# Patient Record
Sex: FEMALE | Race: WHITE
[De-identification: names, ages, dates, MRNs, and addresses within clinical notes are randomized per-mention and may not be internally consistent; named-entity substitution may affect disease eponyms.]

---

## 2017-03-19 ENCOUNTER — HOSPITAL ENCOUNTER (INPATIENT)
Dept: HOSPITAL 80 - FED | Age: 82
LOS: 4 days | Discharge: NURSING, REHAB OR LONG-TERM CARE | DRG: 313 | End: 2017-03-23
Attending: INTERNAL MEDICINE | Admitting: INTERNAL MEDICINE
Payer: COMMERCIAL

## 2017-03-19 DIAGNOSIS — I49.5: ICD-10-CM

## 2017-03-19 DIAGNOSIS — Z99.81: ICD-10-CM

## 2017-03-19 DIAGNOSIS — Z95.0: ICD-10-CM

## 2017-03-19 DIAGNOSIS — N18.9: ICD-10-CM

## 2017-03-19 DIAGNOSIS — J96.10: ICD-10-CM

## 2017-03-19 DIAGNOSIS — I95.9: ICD-10-CM

## 2017-03-19 DIAGNOSIS — Z95.5: ICD-10-CM

## 2017-03-19 DIAGNOSIS — F03.90: ICD-10-CM

## 2017-03-19 DIAGNOSIS — I25.2: ICD-10-CM

## 2017-03-19 DIAGNOSIS — I48.0: ICD-10-CM

## 2017-03-19 DIAGNOSIS — R07.9: Primary | ICD-10-CM

## 2017-03-19 DIAGNOSIS — I25.10: ICD-10-CM

## 2017-03-19 LAB
% IMMATURE GRANULYOCYTES: 0.2 % (ref 0–1.1)
ABSOLUTE IMMATURE GRANULOCYTES: 0.01 10^3/UL (ref 0–0.1)
ABSOLUTE NRBC COUNT: 0 10^3/UL (ref 0–0.01)
ADD DIFF?: NO
ADD MORPH?: NO
ADD SCAN?: NO
ANION GAP SERPL CALC-SCNC: 11 MEQ/L (ref 8–16)
ATYPICAL LYMPHOCYTE FLAG: 0 (ref 0–99)
CALCIUM SERPL-MCNC: 8.3 MG/DL (ref 8.5–10.4)
CHLORIDE SERPL-SCNC: 101 MEQ/L (ref 97–110)
CO2 SERPL-SCNC: 25 MEQ/L (ref 22–31)
CREAT SERPL-MCNC: 1.3 MG/DL (ref 0.6–1)
DIGOXIN SERPL-MCNC: 2.4 NG/ML (ref 0.8–2)
ERYTHROCYTE [DISTWIDTH] IN BLOOD BY AUTOMATED COUNT: 14.2 % (ref 11.5–15.2)
FRAGMENT RBC FLAG: 0 (ref 0–99)
GFR SERPL CREATININE-BSD FRML MDRD: 39 ML/MIN/{1.73_M2}
GLUCOSE SERPL-MCNC: 95 MG/DL (ref 70–100)
HCT VFR BLD CALC: 37.7 % (ref 38–47)
HGB BLD-MCNC: 12.3 G/DL (ref 12.6–16.3)
LEFT SHIFT FLG: 0 (ref 0–99)
LIPEMIA HEMOLYSIS FLAG: 80 (ref 0–99)
MCH RBC BLDCO QN: 29.5 PG (ref 27.9–34.1)
MCHC RBC AUTO-ENTMCNC: 32.6 G/DL (ref 32.4–36.7)
MCV RBC AUTO: 90.4 FL (ref 81.5–99.8)
NRBC-AUTO%: 0 % (ref 0–0.2)
PLATELET # BLD: 139 10^3/UL (ref 150–400)
PLATELET CLUMPS FLAG: 0 (ref 0–99)
PMV BLD AUTO: 9.8 FL (ref 8.7–11.7)
POTASSIUM SERPL-SCNC: 5.1 MEQ/L (ref 3.5–5.2)
RBC # BLD AUTO: 4.17 10^6/UL (ref 4.18–5.33)
SODIUM SERPL-SCNC: 137 MEQ/L (ref 134–144)
TROPONIN I SERPL-MCNC: 0.12 NG/ML (ref 0–0.03)

## 2017-03-19 PROCEDURE — A9500 TC99M SESTAMIBI: HCPCS

## 2017-03-19 PROCEDURE — G0378 HOSPITAL OBSERVATION PER HR: HCPCS

## 2017-03-19 RX ADMIN — DOFETILIDE SCH MG: 0.25 CAPSULE ORAL at 20:45

## 2017-03-19 RX ADMIN — ATORVASTATIN CALCIUM SCH MG: 40 TABLET, FILM COATED ORAL at 20:45

## 2017-03-19 RX ADMIN — GABAPENTIN SCH MG: 300 CAPSULE ORAL at 20:45

## 2017-03-19 RX ADMIN — POTASSIUM CHLORIDE SCH MEQ: 1.5 POWDER, FOR SOLUTION ORAL at 20:45

## 2017-03-19 NOTE — ECHO
[id number]

[id number]



+---------------------------------------------------+      [address]

:                                                   :       [place] [address]

:                                                   :           [phone]

+---------------------------------------------------+       Fax [phone]



                       Adult Echocardiographic Report



+---------------------------------------------------------------------------+

:Name: SEDA RENE LStudy Date: 2017 11:31 AM                        :

:MRN: [id number]     Hospital Admission Number: [id number]                :

:: 1934     Gender: Female                         Height: 65 in   :

:Age: 82 yrs         Race: WH                               Weight: 145 lb  :

:Reason For Study: Eval LV Fx                                               :

:                                                           BSA: 1.7 meters2:

:History: CP, SOB, Pacemaker, Stents                                        :

+---------------------------------------------------------------------------+

MMode/2D Measurements \T\ Calculations

IVSd: 1.0 cm   LVIDd: 4.5 cm  FS: 25.5 %             Ao root diam: 2.9 cm

LVPWd: 1.2 cm  LVIDs: 3.3 cm  EDV(Teich): 91.0 ml    ACS: 1.4 cm

                              ESV(Teich): 45.1 ml

                              EF(Teich): 50.5 %



Normal Measurement Values:

+----------------------------------------------------------------------------

----------------------------------+

:LVIDd (3.5-5.7cm)    IVSd (0.6-1.1cm)     LVPWd (0.6-1.1cm)     Aortic Root 

(2.0-3.7cm)Left Atrium (1.5-4.0cm):

:LV Vol(d) (76-115ml) LV Vol(s) (29-48ml)  Ejec Fraction (50-65%)PV Otilio (0.6-

1.2m/s)    TV Otilio (0.4-1.0m/s)    :

:MV E Otilio (0.8-1.0m/s)MV A Otilio (0.3-1.0m/s)LVOT Otilio (0.7-1.2m/s) Asc Ao Otilio (

0.9-1.8m/s)                       :

+----------------------------------------------------------------------------

----------------------------------+

Doppler Measurements \T\ Calculations

MV E max otilio:       Ao V2 max:         LV V1 max:        TR max otilio:

103.7 cm/sec        169.7 cm/sec       112.5 cm/sec      259.3 cm/sec

MV A max otilio:       Ao max PG:         LV V1 max PG:     TR max P.0 cm/sec         11.5 mmHg          5.1 mmHg          26.9 mmHg

MV E/A: 2.5                                              RAP systole:

                                                         5.0 mmHg

                                                         RVSP(TR): 31.9 mmHg



Left Ventricle

The left ventricle is normal in size. There is normal left ventricular wall

thickness. Left ventricular systolic function is low normal. There is basiar

to mid anteroseptal hypokinesis, etiology is uncertain, CAD or pace

dysschrony.





Right Ventricle

There is a pacemaker lead in the right ventricle. The right ventricle is

normal in size and function.





Atria

The left atrium is mildly dilated. Right atrial size is normal.



Mitral Valve

The mitral valve leaflets appear thickened, but open well. There is mild

mitral annular calcification. There is no evidence of mitral valve prolapse.

There is no mitral valve stenosis. There is mild mitral regurgitation.



Tricuspid Valve

The tricuspid valve is normal in structure and function. There is mild

tricuspid regurgitation. Right ventricular systolic pressure is 32mmHg.

Doppler findings do not suggest pulmonary hypertension.



Aortic Valve

The aortic valve is trileaflet. The aortic valve opens well. There is no

aortic stenosis. There is no aortic insufficiency.



Pulmonic Valve

The pulmonic valve is normal in structure and function. There is no pulmonic

valvular regurgitation.





Great Vessels

The aortic root is normal size.



Pericardium/Pleural

There is no pericardial effusion.



Conclusion

A complete two-dimensional transthoracic echocardiogram was performed (2D,

M-mode, Doppler and color flow Doppler).

The patient is tachycardic

Left ventricular systolic function is low normal.

There is basiar to mid anteroseptal hypokinesis, etiology is uncertain, CAD

or pace dysschrony.

There is a pacemaker lead in the right ventricle.

The left atrium is mildly dilated.

The mitral valve leaflets appear thickened, but open well.

There is mild mitral annular calcification.

There is mild mitral regurgitation.

There is mild tricuspid regurgitation.

Right ventricular systolic pressure is 32mmHg.

Doppler findings do not suggest pulmonary hypertension.

The aortic valve is trileaflet.

There is no pericardial effusion.



Compared with 2016, wall motion description is similar. Small

pericardial effusion no longer present.





_____________________________________________________________________________





Final Reading Physician:

                        Dr Dolores Mitchell  electronically signed on 2017

                        12:19 PM

Ordering Physician: CÉSAR SANCHEZ

Performed By: Pavel Parish, CECILIACS

## 2017-03-19 NOTE — CPEKG
Heart Rate: 79

RR Interval: 759

P-R Interval: 168

QRSD Interval: 126

QT Interval: 408

QTC Interval: 468

P Axis: -63

QRS Axis: -61

T Wave Axis: 101

EKG Severity - ABNORMAL ECG -

EKG Impression: ATRIAL-PACED COMPLEXES

EKG Impression: LEFT BUNDLE BRANCH BLOCK

Electronically Signed By: Ping Maldonado 19-Mar-2017 09:54:18

## 2017-03-19 NOTE — EDPHY
H & P


Time Seen by Provider: 03/19/17 09:18


HPI/ROS: 





CHIEF COMPLAINT: Chest pain





HISTORY OF PRESENT ILLNESS: This patient is an 82 year old woman, with a 

history of coronary artery disease h/o LAD and RCA stent, chronic renal disease

, and chronic supplemental oxygen use (2L), arriving by EMS from assisted 

living facility with three days of chest pain. The pain has been intermittent 

and moderate in severity; aggravated by deep inspiration and with exertion. It 

is associated with three days of increased dyspnea on exertion and fatigue. She 

has no pain at the time of this history. Symptoms are unchanged by movement or 

twisting of her torso. Denies recent illness or fever.  History of past anginal 

equivalent presenting as dyspnea on exertion.  





LIMITATIONS: History is somewhat limited secondary to dementia. Her daughter is 

at bedside and assisted with this history. 





REVIEW OF SYSTEMS:


Constitutional: No fever, no chills


Eyes: No visual changes


ENT: No sore throat


Respiratory: dyspnea on exertion, no cough


Cardiac:  chest pain which is pleuritic in nature and is aggravated by exertion


Gastrointestinal: No nausea, no vomiting, no abdominal pain


Genitourinary: No hematuria, no dysuria


Musculoskeletal: No leg pain or swelling


Skin: No rash


Neurological: Recent mental cognitive decline. No headache, no numbness, no 

weakness


Psychiatric: No depression





Past Medical/Surgical History: 





history of coronary artery disease h/o LAD and RCA stent (last 08/2016), 

paroxysmal atrial fibrillation, s/p pacemaker placement, chronic renal disease, 

chronic supplemental oxygen use (2L), chronic back pain x 45 years





Social History: 





Cardiologist is Dr. Moran. Lives in assisted living facility. Her daughter is 

at bedside.





Smoking Status: Never smoked


Physical Exam: 





General Appearance: Alert, no distress


Eyes: Pupils equal and round, no conjunctival pallor or injection


ENT, Mouth: Mucous membranes moist


Neck: Normal inspection


Respiratory: Lungs are clear to auscultation


Cardiovascular: Regular rate and rhythm 


Gastrointestinal:  Abdomen is soft and non- tender 


Neurological:  A&O, nonfocal, normal gait


Skin:  Warm and dry, no rash, two fentanyl patches on back


Extremities:  Nontender, no pedal edema


Psychiatric: Flat affect





Constitutional: 


 Initial Vital Signs











Temperature (C)  37.3 C   03/19/17 09:16


 


Heart Rate  80   03/19/17 09:16


 


Respiratory Rate  18   03/19/17 09:16


 


Blood Pressure  97/52 L  03/19/17 09:16


 


O2 Sat (%)  92   03/19/17 09:16








 











O2 Delivery Mode               Nasal Cannula


 


O2 (L/minute)                  3














Allergies/Adverse Reactions: 


 





nickel [Nickel] Allergy (Severe, Verified 07/22/14 11:24)


 Contact Dermatitis


Penicillins Allergy (Severe, Verified 07/22/14 11:24)


 Rash


Sulfa (Sulfonamide Antibiotics) [Sulfa(Sulfonamide Antibiotics)] Allergy (Severe

, Verified 07/22/14 11:24)


 Nausea


SEASONAL Allergy (Severe, Uncoded 07/22/14 11:24)


 Rhinitis








Home Medications: 














 Medication  Instructions  Recorded


 


Cetirizine [ZyrTEC 10 mg (*)] 10 mg PO DAILY 01/24/13


 


Clopidogrel Bisulfate [Plavix (*)] 75 mg PO DAILY #0 tab 06/24/14


 


Calcium Carb/Vit D3/Minerals 1 each PO DAILY 02/22/15





[Calcium 600+D Plus Minerals Tb]  


 


Hydrocodone/Acetaminophen [Norco 1 each PO Q6 PRN 02/22/15





5/325 (*)]  


 


Digoxin [Lanoxin 0.125 mg] 0.125 mg PO DAILY10 #0 tab 03/05/15


 


Acetaminophen [Tylenol 325mg (*)] 650 mg PO Q6 PRN 08/22/16


 


Atorvastatin Calcium [Lipitor 40 40 mg PO HS 08/22/16





mg (*)]  


 


Fludrocortisone Acetate [Florinef] 0.2 mg PO DAILY 08/22/16


 


Gabapentin [Neurontin 300 MG (*)] 300 mg PO BID 08/22/16


 


Hydrocodone/Acetaminophen [Molino 1 each PO BID 08/22/16





5/325 (*)]  


 


LORazepam [Ativan (*)] 0.25 mg PO DAILY PRN 08/22/16


 


LORazepam [Ativan (*)] 0.25 mg PO HS 08/22/16


 


Meclizine HCl 12.5 mg PO TID PRN 08/22/16


 


Methylcellulose [Citrucel] 1,000 mg PO DAILY 08/22/16


 


Pantoprazole Sodium [Protonix 40mg 40 mg PO DAILY 08/22/16





(*)]  


 


fentaNYL [Duragesic 12 MCG Patch 12 mcg TD Q72H 08/22/16





(*)]  


 


fentaNYL [Duragesic 25 MCG Patch 25 mcg TD Q72H 08/22/16





(*)]  


 


Aspirin [Aspirin 81mg (*)] 81 mg PO DAILY 03/19/17


 


Calcium Carbonate [Tums 500MG (*)] 1,000 mg PO Q8 PRN 03/19/17


 


Dofetilide [Tikosyn 0.25 MG (*)] 0.25 mg PO BID 03/19/17


 


Loperamide HCl [Imodium 2 mg (*)] 2 mg PO Q6 PRN 03/19/17


 


Magnesium Hydroxide [Milk of 30 ml PO DAILY PRN 03/19/17





Magnesia]  


 


Methyl Salicylate/Menth/Camph 1 each TP DAILY PRN 03/19/17





[Salonpas Large Patch]  


 


Mometasone Furoate 2 spray NS DAILY PRN 03/19/17


 


Ondansetron Odt [Zofran Odt 4 mg 4 mg PO Q8 PRN 03/19/17





(*)]  


 


Potassium Chloride [Klor-Con] 20 meq PO BID 03/19/17


 


Sertraline HCl [Zoloft 100mg (*)] 100 mg PO DAILY 03/19/17


 


celeCOXIB [Celebrex (*)] 200 mg PO DAILY 03/19/17


 


guaiFENesin [Mucinex 600 MG (*)] 600 mg PO BID PRN 03/19/17














Medical Decision Making





- Diagnostics


EKG Interpretation: 





The 12 lead EKG was interpreted by myself. Atrial paced complexes, rate 79, 

LBBB. Compared to previous EKGs, no acute ischemic changes. See hard copy and/

or "tracemaster" electronic copy for interpretation.





Imaging: 





Chest x-ray was obtained.  I viewed the images myself on the PACS system.  My 

interpretation of the images is: No acute disease. Hiatal hernia present.  The 

radiologist interpretation is pending at this time.  I discussed the x-ray 

findings with the patient.





ED Course/Re-evaluation: 





This patient presents with three days of intermittent chest pain, pleuritic and 

worse with exertion. Associated with dyspnea on exertion. History is 

significant for CAD with LAD and RCA stents (last in 08/2016). Anginal 

equivalent has presented with dyspnea on exertion previously. An IV has been 

established. She received 500mL IV normal saline for hypotension, BP 77/45. 





EKG shows chronic LBBB, but no acute ischemic changes compared to previous 

EKGs. Chest XR shows no acute findings.  Troponin is elevated at 0.125. Pt 

remains asymptomatic.  ASA already taken. Patient will be admitted for acute 

coronary syndrome.





1025: I consulted with the hospitalist service. Dr. Barr accepts admission. 

  Blood pressure has improved to 103/55 after fluid bolus.  Continues to be 

asymptomatic.  Dr. Moran has been notified of the patient's admission. 

Echocardiogram has been ordered.





Differential Diagnosis: 





includes though not limited to ACS, PE, pneumonia, pulmonary edema, empyema.








- Data Points


Laboratory Results: 


 Laboratory Results





 03/19/17 09:35 





 03/19/17 09:35 








Medications Given: 


 








Discontinued Medications





Aspirin (Aspirin)  324 mg PO EDNOW ONE


   Stop: 03/19/17 10:30


   Last Admin: 03/19/17 10:38 Dose:  324 mg


Sodium Chloride (Ns)  500 mls @ 0 mls/hr IV ONCE ONE


   PRN Reason: As Directed


   Stop: 03/19/17 09:39


   Last Admin: 03/19/17 09:41 Dose:  500 mls








Departure





- Departure


Disposition: [facility] [place] [facility]


Clinical Impression: 


 Acute coronary syndrome, Elevated troponin





Condition: Fair


Report Scribed for: Ping Maldonado


Report Scribed by: Criselda Carver


Date of Report: 03/19/17


Time of Report: 09:24


Physician Review and Approval Statement: 





03/19/17 09:24


Portions of this note were transcribed by a medical scribe.  I personally 

performed a history, physical exam, medical decision making, and confirmed 

accuracy of information the transcribed note.

## 2017-03-20 LAB
ANION GAP SERPL CALC-SCNC: 6 MEQ/L (ref 8–16)
CALCIUM SERPL-MCNC: 7.9 MG/DL (ref 8.5–10.4)
CHLORIDE SERPL-SCNC: 102 MEQ/L (ref 97–110)
CO2 SERPL-SCNC: 26 MEQ/L (ref 22–31)
CREAT SERPL-MCNC: 1.1 MG/DL (ref 0.6–1)
GFR SERPL CREATININE-BSD FRML MDRD: 48 ML/MIN/{1.73_M2}
GLUCOSE SERPL-MCNC: 88 MG/DL (ref 70–100)
POTASSIUM SERPL-SCNC: 4.8 MEQ/L (ref 3.5–5.2)
SODIUM SERPL-SCNC: 134 MEQ/L (ref 134–144)

## 2017-03-20 RX ADMIN — CLOPIDOGREL BISULFATE SCH MG: 75 TABLET, FILM COATED ORAL at 08:05

## 2017-03-20 RX ADMIN — POTASSIUM CHLORIDE SCH MEQ: 1.5 POWDER, FOR SOLUTION ORAL at 20:24

## 2017-03-20 RX ADMIN — SERTRALINE HYDROCHLORIDE SCH MG: 100 TABLET ORAL at 08:07

## 2017-03-20 RX ADMIN — POTASSIUM CHLORIDE SCH MEQ: 1.5 POWDER, FOR SOLUTION ORAL at 08:05

## 2017-03-20 RX ADMIN — ATORVASTATIN CALCIUM SCH MG: 40 TABLET, FILM COATED ORAL at 20:24

## 2017-03-20 RX ADMIN — CETIRIZINE HYDROCHLORIDE SCH MG: 10 TABLET, FILM COATED ORAL at 08:07

## 2017-03-20 RX ADMIN — DOFETILIDE SCH MG: 0.25 CAPSULE ORAL at 20:24

## 2017-03-20 RX ADMIN — GABAPENTIN SCH MG: 300 CAPSULE ORAL at 20:24

## 2017-03-20 RX ADMIN — DOFETILIDE SCH MG: 0.25 CAPSULE ORAL at 08:06

## 2017-03-20 RX ADMIN — GABAPENTIN SCH MG: 300 CAPSULE ORAL at 08:06

## 2017-03-20 RX ADMIN — Medication SCH MG: at 10:01

## 2017-03-20 RX ADMIN — PANTOPRAZOLE SODIUM SCH MG: 40 TABLET, DELAYED RELEASE ORAL at 08:06

## 2017-03-20 RX ADMIN — ASPIRIN 81 MG SCH MG: 81 TABLET ORAL at 08:07

## 2017-03-20 RX ADMIN — FLUDROCORTISONE ACETATE SCH MG: 0.1 TABLET ORAL at 08:06

## 2017-03-20 NOTE — HOSPPROG
Hospitalist Progress Note


Assessment/Plan: 





82-year-old female admitted with a complaint of chest pain.  Serial troponin 

show very mild elevation without a rising trend.  Cardiac echo shows a normal 

to low normal LV function without signs of wall motion abnormality.  Patient 

has been on  medical management since cardiac catheterization and stent 

placement in October 2016 by Dr. Bryn Moran. 





-chest pain:  Mild elevation of troponin without a rising trend.





-known coronary artery disease under medical management





-chronic respiratory failure on supplemental oxygen





-history of AFib in patient has refused anticoagulation.  She received dual anti

-platelet therapy for her stent placement and known coronary artery disease.





-status post ppm remotely





-dementia:  She seems to be have her baseline here.  She lives in assisted 

living at [facility]





-chronic pain syndrome:  This seems under good control with the use of 

Neurontin and a fentanyl patch along with supplemental Norco.  Patient does 

have a history of requesting frequent pain medications and we are limiting her 

access to narcotics.





-code status:  DNR





Plan:  Cardiology consultation with Dr. Bryn Moran.  Possible discharge today 

following this consultation


Subjective: no complaints


Objective: 


 Vital Signs











Temp Pulse Resp BP Pulse Ox


 


 36.6 C   78   19   127/68 H  97 


 


 03/20/17 08:00  03/20/17 08:00  03/20/17 08:00  03/20/17 08:00  03/20/17 08:00








 Laboratory Results





 03/20/17 03:35 





 











 03/19/17 03/20/17 03/21/17





 05:59 05:59 05:59


 


Intake Total  1100 


 


Output Total  1150 450


 


Balance  -50 -450














- Physical Exam


Constitutional: no apparent distress


Eyes: PERRL, anicteric sclera


Ears, Nose, Mouth, Throat: moist mucous membranes


Cardiovascular: regular rate and rhythym, systolic murmur


Respiratory: no respiratory distress, no rales or rhonchi, clear to auscultation


Gastrointestinal: normoactive bowel sounds, soft, non-tender abdomen


Skin: warm


Neurologic: CN II-XII Intact





ICD10 Worksheet


Patient Problems: 


 Problems











Problem Status Onset


 


Humerus shaft fracture Acute  


 


CAD (coronary artery disease) Acute  


 


Obstructive sleep apnea Acute  


 


Elevated troponin Acute  


 


Weakness Acute  


 


Acute coronary syndrome Acute

## 2017-03-21 RX ADMIN — DOFETILIDE SCH MG: 0.25 CAPSULE ORAL at 20:12

## 2017-03-21 RX ADMIN — POTASSIUM CHLORIDE SCH MEQ: 1.5 POWDER, FOR SOLUTION ORAL at 20:12

## 2017-03-21 RX ADMIN — GABAPENTIN SCH MG: 300 CAPSULE ORAL at 20:11

## 2017-03-21 RX ADMIN — Medication SCH MG: at 08:29

## 2017-03-21 RX ADMIN — CLOPIDOGREL BISULFATE SCH MG: 75 TABLET, FILM COATED ORAL at 08:29

## 2017-03-21 RX ADMIN — PANTOPRAZOLE SODIUM SCH MG: 40 TABLET, DELAYED RELEASE ORAL at 08:30

## 2017-03-21 RX ADMIN — ENOXAPARIN SODIUM SCH MG: 100 INJECTION SUBCUTANEOUS at 14:08

## 2017-03-21 RX ADMIN — ASPIRIN 81 MG SCH MG: 81 TABLET ORAL at 08:29

## 2017-03-21 RX ADMIN — FLUDROCORTISONE ACETATE SCH MG: 0.1 TABLET ORAL at 08:30

## 2017-03-21 RX ADMIN — CETIRIZINE HYDROCHLORIDE SCH MG: 10 TABLET, FILM COATED ORAL at 08:29

## 2017-03-21 RX ADMIN — POTASSIUM CHLORIDE SCH MEQ: 1.5 POWDER, FOR SOLUTION ORAL at 08:30

## 2017-03-21 RX ADMIN — GABAPENTIN SCH MG: 300 CAPSULE ORAL at 08:29

## 2017-03-21 RX ADMIN — ATORVASTATIN CALCIUM SCH MG: 40 TABLET, FILM COATED ORAL at 20:10

## 2017-03-21 RX ADMIN — SERTRALINE HYDROCHLORIDE SCH MG: 100 TABLET ORAL at 08:29

## 2017-03-21 RX ADMIN — DOFETILIDE SCH MG: 0.25 CAPSULE ORAL at 08:30

## 2017-03-21 NOTE — HOSPPROG
Hospitalist Progress Note


Assessment/Plan: 





# chest pain - will get nuc stress here with indmichael trop; has been seen by Dr Dean gaviria


# CAD s/p PCI to RCA and LAD, last stent 10/2016


   - cont asa/plavix/statin


# paroxysmal a-fib/ppm: digoxin, dofetilide, asa for CVA ppx


   - currently paced


# CKD at [facility]


# chronic pain on continuous narcotics - fentanyl TD


# hypotension: florinef


# possible dementia - unclear baseline, but living in [facility] at [facility]


# chronic resp failure on 2L O2


# DNR


# lovenox





##


new pt to me


chart reviewed


CXR personally reveiwed


ECG personally reviewed





Subjective: no chest pain; does not remember having had CP


Objective: 


 Vital Signs











Temp Pulse Resp BP Pulse Ox


 


 36.5 C   107 H  14   114/63   95 


 


 03/21/17 12:00  03/21/17 12:00  03/21/17 12:00  03/21/17 12:00  03/21/17 12:00








 Laboratory Results





 03/20/17 03:35 





 











 03/20/17 03/21/17 03/22/17





 05:59 05:59 05:59


 


Intake Total 1100 550 


 


Output Total 1150 1650 


 


Balance -50 -1100 














- Physical Exam


Constitutional: no apparent distress, appears nourished


Cardiovascular: regular rate and rhythym, no murmur, rub, or gallop


Respiratory: no respiratory distress, no rales or rhonchi, clear to auscultation


Gastrointestinal: normoactive bowel sounds, soft, non-tender abdomen, no 

palpable masses





ICD10 Worksheet


Patient Problems: 


 Problems











Problem Status Onset


 


Humerus shaft fracture Acute  


 


CAD (coronary artery disease) Acute  


 


Obstructive sleep apnea Acute  


 


Elevated troponin Acute  


 


Weakness Acute  


 


Acute coronary syndrome Acute

## 2017-03-22 LAB
% IMMATURE GRANULYOCYTES: 0.2 % (ref 0–1.1)
ABSOLUTE IMMATURE GRANULOCYTES: 0.01 10^3/UL (ref 0–0.1)
ABSOLUTE NRBC COUNT: 0 10^3/UL (ref 0–0.01)
ADD DIFF?: NO
ADD MORPH?: NO
ADD SCAN?: NO
ANION GAP SERPL CALC-SCNC: 10 MEQ/L (ref 8–16)
ATYPICAL LYMPHOCYTE FLAG: 80 (ref 0–99)
CALCIUM SERPL-MCNC: 8.6 MG/DL (ref 8.5–10.4)
CHLORIDE SERPL-SCNC: 103 MEQ/L (ref 97–110)
CO2 SERPL-SCNC: 24 MEQ/L (ref 22–31)
CREAT SERPL-MCNC: 0.9 MG/DL (ref 0.6–1)
ERYTHROCYTE [DISTWIDTH] IN BLOOD BY AUTOMATED COUNT: 13.9 % (ref 11.5–15.2)
FRAGMENT RBC FLAG: 0 (ref 0–99)
GFR SERPL CREATININE-BSD FRML MDRD: 60 ML/MIN/{1.73_M2}
GLUCOSE SERPL-MCNC: 81 MG/DL (ref 70–100)
HCT VFR BLD CALC: 35.8 % (ref 38–47)
HGB BLD-MCNC: 11.6 G/DL (ref 12.6–16.3)
LEFT SHIFT FLG: 0 (ref 0–99)
LIPEMIA HEMOLYSIS FLAG: 80 (ref 0–99)
MCH RBC BLDCO QN: 28.6 PG (ref 27.9–34.1)
MCHC RBC AUTO-ENTMCNC: 32.4 G/DL (ref 32.4–36.7)
MCV RBC AUTO: 88.4 FL (ref 81.5–99.8)
NRBC-AUTO%: 0 % (ref 0–0.2)
PLATELET # BLD: 155 10^3/UL (ref 150–400)
PLATELET CLUMPS FLAG: 0 (ref 0–99)
PMV BLD AUTO: 9.4 FL (ref 8.7–11.7)
POTASSIUM SERPL-SCNC: 4.2 MEQ/L (ref 3.5–5.2)
RBC # BLD AUTO: 4.05 10^6/UL (ref 4.18–5.33)
SODIUM SERPL-SCNC: 137 MEQ/L (ref 134–144)

## 2017-03-22 RX ADMIN — GABAPENTIN SCH MG: 300 CAPSULE ORAL at 20:03

## 2017-03-22 RX ADMIN — POTASSIUM CHLORIDE SCH MEQ: 1.5 POWDER, FOR SOLUTION ORAL at 20:03

## 2017-03-22 RX ADMIN — ASPIRIN 81 MG SCH MG: 81 TABLET ORAL at 10:12

## 2017-03-22 RX ADMIN — FLUDROCORTISONE ACETATE SCH MG: 0.1 TABLET ORAL at 10:12

## 2017-03-22 RX ADMIN — POTASSIUM CHLORIDE SCH MEQ: 1.5 POWDER, FOR SOLUTION ORAL at 10:13

## 2017-03-22 RX ADMIN — LOPERAMIDE HYDROCHLORIDE PRN MG: 2 CAPSULE ORAL at 20:03

## 2017-03-22 RX ADMIN — Medication SCH: at 10:26

## 2017-03-22 RX ADMIN — DOFETILIDE SCH MG: 0.25 CAPSULE ORAL at 10:24

## 2017-03-22 RX ADMIN — DOFETILIDE SCH MG: 0.25 CAPSULE ORAL at 20:03

## 2017-03-22 RX ADMIN — CETIRIZINE HYDROCHLORIDE SCH MG: 10 TABLET, FILM COATED ORAL at 10:12

## 2017-03-22 RX ADMIN — Medication SCH MG: at 10:24

## 2017-03-22 RX ADMIN — CLOPIDOGREL BISULFATE SCH MG: 75 TABLET, FILM COATED ORAL at 10:14

## 2017-03-22 RX ADMIN — SERTRALINE HYDROCHLORIDE SCH MG: 100 TABLET ORAL at 10:13

## 2017-03-22 RX ADMIN — GABAPENTIN SCH MG: 300 CAPSULE ORAL at 10:12

## 2017-03-22 RX ADMIN — ENOXAPARIN SODIUM SCH MG: 100 INJECTION SUBCUTANEOUS at 10:14

## 2017-03-22 RX ADMIN — PANTOPRAZOLE SODIUM SCH MG: 40 TABLET, DELAYED RELEASE ORAL at 10:12

## 2017-03-22 RX ADMIN — ATORVASTATIN CALCIUM SCH MG: 40 TABLET, FILM COATED ORAL at 20:03

## 2017-03-22 NOTE — PDCARST
CAR Stress Test Results


Type of Stress Test: Lexiscan stress test


Indication: elevated trop/cp


Description of Procedure: After informed consent was obtained, pt was 

established to ECG, HR, oximetry, and BP monitoring. At b/;, pt has A-paced and 

V-paced (or V-sensed with BBB), /88, HR 80s, and normal O2 sat on 3 lpm 

supplemental O2. Lexiscan was infused with typical side effects. Vitals and ECG 

remained stable throughout testing and in recovery.


Impression: Uneventful Lexiscan infusion


Conclusion: Await nuclear imaging

## 2017-03-22 NOTE — HOSPPROG
Hospitalist Progress Note


Assessment/Plan: 








# chest pain/azalia with infarcts, no ischemia


   - will d/w cardiology but suspect medical management appropriate


# CAD s/p PCI to RCA and LAD, last stent 10/2016


   - cont asa/plavix/statin


# paroxysmal a-fib/ppm: digoxin, dofetilide, asa for CVA ppx


   - currently paced


# CKD at [facility]


# chronic pain on continuous narcotics - fentanyl TD


# hypotension: florinef


# possible dementia - unclear baseline, but living in [place] at [facility]


# chronic resp failure on 2L O2


# DNR


# lovenox





##


lexiscan reviewed





Subjective: s/p stress; diarrhea yesterday


Objective: 


 Vital Signs











Temp Pulse Resp BP Pulse Ox


 


 36.5 C   81   18   152/82 H  94 


 


 03/22/17 15:39  03/22/17 15:39  03/22/17 15:39  03/22/17 15:39  03/22/17 15:39








 Laboratory Results





 03/22/17 04:05 





 03/22/17 04:05 





 











 03/21/17 03/22/17 03/23/17





 05:59 05:59 05:59


 


Intake Total  750 


 


Output Total  550 600


 


Balance  200 -600














- Physical Exam


Constitutional: no apparent distress, appears nourished


Cardiovascular: regular rate and rhythym, no murmur, rub, or gallop


Respiratory: no respiratory distress, no rales or rhonchi, clear to auscultation


Gastrointestinal: normoactive bowel sounds, soft, non-tender abdomen, no 

palpable masses





ICD10 Worksheet


Patient Problems: 


 Problems











Problem Status Onset


 


Humerus shaft fracture Acute  


 


CAD (coronary artery disease) Acute  


 


Obstructive sleep apnea Acute  


 


Elevated troponin Acute  


 


Weakness Acute  


 


Acute coronary syndrome Acute

## 2017-03-22 NOTE — CPEKG
Heart Rate: 136

RR Interval: 441

P-R Interval: 160

QRSD Interval: 132

QT Interval: 376

QTC Interval: 566

P Axis: 0

QRS Axis: -63

T Wave Axis: 99

EKG Severity - ABNORMAL ECG -

EKG Impression: ATRIAL FIBRILLATION WITH RAPID V-RATE

EKG Impression: LEFT BUNDLE BRANCH BLOCK

Electronically Signed By: Darryn Rendon 23-Mar-2017 14:20:13

## 2017-03-22 NOTE — HOSPPROG
Hospitalist Progress Note


Assessment/Plan: 





XC note:


Called by RN for rapid HR, up to 170's, now 130's.  No symptoms.  Reviewed tele 

and EKG.  She is on Tikosyn.  Digoxin held likely due to level of 2.4 on 

admission (3 days ago).  Repeat Dig level 0.6.  She is given 0.125 mg Digoxin 

for rate control tonight.


Objective: 


 Vital Signs











Temp Pulse Resp BP Pulse Ox


 


 36.3 C   78   24 H  100/79   97 


 


 03/22/17 19:55  03/22/17 19:55  03/22/17 19:55  03/22/17 19:55  03/22/17 19:55








 Laboratory Results





 03/22/17 04:05 





 03/22/17 04:05 





 











 03/21/17 03/22/17 03/23/17





 05:59 05:59 05:59


 


Intake Total  750 500


 


Output Total  550 800


 


Balance  200 -300














ICD10 Worksheet


Patient Problems: 


 Problems











Problem Status Onset


 


Acute coronary syndrome Acute  


 


Elevated troponin Acute  


 


CAD (coronary artery disease) Acute  


 


Humerus shaft fracture Acute  


 


Obstructive sleep apnea Acute  


 


Weakness Acute

## 2017-03-23 VITALS — SYSTOLIC BLOOD PRESSURE: 149 MMHG | DIASTOLIC BLOOD PRESSURE: 75 MMHG | HEART RATE: 75 BPM

## 2017-03-23 VITALS — TEMPERATURE: 97.8 F

## 2017-03-23 VITALS — RESPIRATION RATE: 18 BRPM | OXYGEN SATURATION: 93 %

## 2017-03-23 RX ADMIN — FLUDROCORTISONE ACETATE SCH MG: 0.1 TABLET ORAL at 10:04

## 2017-03-23 RX ADMIN — POTASSIUM CHLORIDE SCH MEQ: 1.5 POWDER, FOR SOLUTION ORAL at 10:04

## 2017-03-23 RX ADMIN — PANTOPRAZOLE SODIUM SCH MG: 40 TABLET, DELAYED RELEASE ORAL at 10:03

## 2017-03-23 RX ADMIN — LOPERAMIDE HYDROCHLORIDE PRN MG: 2 CAPSULE ORAL at 11:17

## 2017-03-23 RX ADMIN — SERTRALINE HYDROCHLORIDE SCH MG: 100 TABLET ORAL at 10:04

## 2017-03-23 RX ADMIN — ASPIRIN 81 MG SCH MG: 81 TABLET ORAL at 10:04

## 2017-03-23 RX ADMIN — Medication SCH MG: at 10:04

## 2017-03-23 RX ADMIN — GABAPENTIN SCH MG: 300 CAPSULE ORAL at 10:03

## 2017-03-23 RX ADMIN — CETIRIZINE HYDROCHLORIDE SCH MG: 10 TABLET, FILM COATED ORAL at 10:03

## 2017-03-23 RX ADMIN — DOFETILIDE SCH MG: 0.25 CAPSULE ORAL at 10:03

## 2017-03-23 RX ADMIN — CLOPIDOGREL BISULFATE SCH MG: 75 TABLET, FILM COATED ORAL at 10:04

## 2017-03-23 NOTE — PDIAF
- Diagnosis


Diagnosis: Chest Pain


Code Status: Do Not Resuscitate





- Medication Management


Discharge Medications: 


 Medications to Continue on Transfer





Cetirizine [ZyrTEC 10 mg (*)] 10 mg PO DAILY 01/24/13 [Last Taken 03/19/17]


Clopidogrel Bisulfate [Plavix (*)] 75 mg PO DAILY #0 tab 06/24/14 [Last Taken 03 /19/17]


Calcium Carb/Vit D3/Minerals [Calcium 600+D Plus Minerals Tb] 1 each PO DAILY 02

/22/15 [Last Taken 03/19/17]


Hydrocodone/Acetaminophen [Norco 5/325 (*)] 1 each PO Q6 PRN 02/22/15 [Last 

Taken 08/17/16]


Digoxin [Lanoxin 0.125 mg] 0.125 mg PO DAILY10 #0 tab 03/05/15 [Last Taken 03/19 /17]


Acetaminophen [Tylenol 325mg (*)] 650 mg PO Q6 PRN 08/22/16 [Last Taken 08/21/16

]


Atorvastatin Calcium [Lipitor 40 mg (*)] 40 mg PO HS 08/22/16 [Last Taken 03/18/ 17]


Fludrocortisone Acetate [Florinef] 0.2 mg PO DAILY 08/22/16 [Last Taken 03/19/17

]


Gabapentin [Neurontin 300 MG (*)] 300 mg PO BID 08/22/16 [Last Taken 03/19/17]


LORazepam [Ativan (*)] 0.25 mg PO DAILY PRN 08/22/16 [Last Taken 08/21/16]


LORazepam [Ativan (*)] 0.25 mg PO HS 08/22/16 [Last Taken 03/18/17]


Meclizine HCl 12.5 mg PO TID PRN 08/22/16 [Last Taken 08/22/16 08:00]


Methylcellulose [Citrucel] 1,000 mg PO DAILY 08/22/16 [Last Taken 03/19/17]


Pantoprazole Sodium [Protonix 40mg (*)] 40 mg PO DAILY 08/22/16 [Last Taken 03/ 19/17]


fentaNYL [Duragesic 12 MCG Patch (*)] 12 mcg TD Q72H 08/22/16 [Last Taken 03/17/ 17]


Aspirin [Aspirin 81mg (*)] 81 mg PO DAILY 03/19/17 [Last Taken 03/19/17]


Calcium Carbonate [Tums 500MG (*)] 1,000 mg PO Q8 PRN 03/19/17 [Last Taken 

Unknown]


Dofetilide [Tikosyn 0.25 MG (*)] 0.25 mg PO BID 03/19/17 [Last Taken 03/19/17]


Loperamide HCl [Imodium 2 mg (*)] 2 mg PO Q6 PRN 03/19/17 [Last Taken Unknown]


Methyl Salicylate/Menth/Camph [Salonpas Large Patch] 1 each TP DAILY PRN 03/19/ 17 [Last Taken Unknown]


Mometasone Furoate 2 spray NS DAILY PRN 03/19/17 [Last Taken Unknown]


Ondansetron Odt [Zofran Odt 4 mg (*)] 4 mg PO Q8 PRN 03/19/17 [Last Taken 

Unknown]


Potassium Chloride [Klor-Con] 20 meq PO BID 03/19/17 [Last Taken 03/19/17]


Sertraline HCl [Zoloft 100mg (*)] 100 mg PO DAILY 03/19/17 [Last Taken 03/19/17]


celeCOXIB [Celebrex (*)] 200 mg PO DAILY 03/19/17 [Last Taken 03/19/17]


guaiFENesin [Mucinex 600 MG (*)] 600 mg PO BID PRN 03/19/17 [Last Taken Unknown]


Metoprolol Tartrate [Lopressor 25 mg (*)] 12.5 mg PO BID #60 tab 03/23/17 [Last 

Taken Unknown]








Discharge Medications: Refer to the Discharge Home Medication list for PRN 

reason.





- Orders


Services needed: Registered Nurse, Certified Nursing Aide, Physical Therapy, 

Occupational Therapy





- Follow Up Care


Current Providers and Referrals: 


Anita Wang MD [Primary Care Provider] - As per Instructions

## 2017-03-23 NOTE — GDS
[f rep st]



                                                             DISCHARGE SUMMARY





DIAGNOSES:  

1.  Chest pain.

2.  Coronary artery disease.  

3.  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

4.  Coronary artery disease, status post PCI to right coronary artery and left anterior descending. 

5.  Paroxysmal atrial fibrillation, status post pacemaker placement.

6.  Chronic kidney disease.

7.  Chronic pain on continuous narcotics.  

8.  Hypotension and hypertension.

9.  Encephalopathy versus dementia.

10.  Chronic respiratory failure.



HOSPITAL COURSE:  This is an 82-year-old female who presented with chest pain.  Troponins were mildl
y elevated at around 0.1 but flat.  She was seen by Dr. Moran.  Lexiscan was ordered which showed 
multifocal infarcts.  Given that there was no ischemia seen, elected for medical management at this 
point.  Discussed this with Dr. Beni Pagan.  She will be discharged with appropriate medications inclu
ding aspirin, Lipitor, addition of a beta-blocker.  She is also on Plavix. 



Course has been slightly complicated by some confusion.  She likely has some underlying dementia but
 has been slightly worse while she is here.  I think that this is probably hospital-induced delirium
, which is mild, on top of her dementia.  I think she will do better after discharge from the hospit
al. 



She has a history of hypotension on Florinef.  She has actually been quite hypertensive today since 
she has been here.  We will add low-dose metoprolol 12.5 p.o. twice daily, which can be discontinued
 if she has any problems from this. 



She has paroxysmal atrial fibrillation as well as a pacemaker.  She had one episode of tachycardia. 
 Her digoxin was restarted after that.  We will continue her dofetilide as well as add metoprolol as
 above.  She is on aspirin for CVA prophylaxis, which is appropriate.



BILLING:  I spent more than 30 minutes on the day of discharge coordinating care.





Job #:  [id number]

## 2017-03-23 NOTE — SOAPPROG
SOAP Progress Note


Assessment/Plan: 


Assessment:


Cardiology ([facility])


1. Admit with intermittent chest pain in the setting of mildly elevated serial 

troponins. Nuclear imaging shows multiple small fixed defects without new 

ischemic territory. There is septal WMA likely consistent with LBBB/pacing.


2. CAD s/p RCA and LAD stenting in August 2016. She remains on DAPT with 

aspirin and Plavix.


3. Uncharacteristically elevated BP.


4. Paroxysmal atrial fibrillation. OAC contraindicated 2/2 fall risk. Rate 

controlled with digoxin, held for supratherapeutic dig level of 2.4, now back 

to normal.


5. History of orthostatic intolerance, treated w/ Florinef.


6. Sick sinus syndrome with PPM in place.


7. Hyperlipidemia.


8. Mild dementia.


9. History of mild renal insufficiency.








Plan:


1. Start metoprolol tartrate 12.5 mg po bid due to elevated BP and rapid 

ventricular rates.


2. Continue all other cardiac medications.


3. OK to discharge back to [facility] today.


4. Office follow up with pacer check in 1-2 weeks.








Subjective: 


No complaints this am. Denies recurrence of chest pain since admission. Can't 

remember having any chest pain prior to admission either. 





Objective: 





 Vital Signs











Temp Pulse Resp BP Pulse Ox


 


 36.6 C   91   16   178/93 H  94 


 


 03/23/17 07:27  03/23/17 07:27  03/23/17 07:27  03/23/17 07:27  03/23/17 07:27








 Laboratory Results





 03/22/17 04:05 





 03/22/17 04:05 





 











 03/22/17 03/23/17 03/24/17





 05:59 05:59 05:59


 


Intake Total 750 500 


 


Output Total 550 1000 


 


Balance 200 -500 














ICD10 Worksheet


Patient Problems: 


 Problems











Problem Status Onset


 


Acute coronary syndrome Acute  


 


Elevated troponin Acute  


 


CAD (coronary artery disease) Acute  


 


Humerus shaft fracture Acute  


 


Obstructive sleep apnea Acute  


 


Weakness Acute

## 2018-09-27 ENCOUNTER — HOSPITAL ENCOUNTER (INPATIENT)
Dept: HOSPITAL 80 - FED | Age: 83
LOS: 5 days | Discharge: NURSING, REHAB OR LONG-TERM CARE | DRG: 292 | End: 2018-10-02
Attending: HOSPITALIST | Admitting: HOSPITALIST
Payer: COMMERCIAL

## 2018-09-27 DIAGNOSIS — B96.20: ICD-10-CM

## 2018-09-27 DIAGNOSIS — I34.0: ICD-10-CM

## 2018-09-27 DIAGNOSIS — N17.9: ICD-10-CM

## 2018-09-27 DIAGNOSIS — I25.10: ICD-10-CM

## 2018-09-27 DIAGNOSIS — N18.3: ICD-10-CM

## 2018-09-27 DIAGNOSIS — I50.21: Primary | ICD-10-CM

## 2018-09-27 DIAGNOSIS — N30.00: ICD-10-CM

## 2018-09-27 DIAGNOSIS — Z66: ICD-10-CM

## 2018-09-27 DIAGNOSIS — I47.2: ICD-10-CM

## 2018-09-27 DIAGNOSIS — I48.91: ICD-10-CM

## 2018-09-27 DIAGNOSIS — Z79.01: ICD-10-CM

## 2018-09-27 DIAGNOSIS — I95.1: ICD-10-CM

## 2018-09-27 DIAGNOSIS — G47.00: ICD-10-CM

## 2018-09-27 DIAGNOSIS — Z95.0: ICD-10-CM

## 2018-09-27 LAB
INR PPP: 1.33 (ref 0.83–1.16)
PLATELET # BLD: 162 10^3/UL (ref 150–400)
PROTHROMBIN TIME: 16.7 SEC (ref 12–15)

## 2018-09-27 PROCEDURE — G0378 HOSPITAL OBSERVATION PER HR: HCPCS

## 2018-09-27 RX ADMIN — METOPROLOL TARTRATE SCH: 50 TABLET, FILM COATED ORAL at 22:38

## 2018-09-27 RX ADMIN — CETIRIZINE HYDROCHLORIDE SCH MG: 10 TABLET, FILM COATED ORAL at 20:28

## 2018-09-27 RX ADMIN — ACETAMINOPHEN SCH MG: 500 TABLET ORAL at 20:28

## 2018-09-27 RX ADMIN — ATORVASTATIN CALCIUM SCH MG: 40 TABLET, FILM COATED ORAL at 20:29

## 2018-09-27 RX ADMIN — DOCUSATE SODIUM AND SENNOSIDES SCH: 50; 8.6 TABLET ORAL at 17:40

## 2018-09-27 RX ADMIN — SODIUM CHLORIDE SCH MLS: 900 INJECTION, SOLUTION INTRAVENOUS at 17:39

## 2018-09-27 RX ADMIN — Medication SCH MG: at 20:27

## 2018-09-27 NOTE — EDPHY
H & P


Stated Complaint: Fatigue, Sent from fauzia's office


Time Seen by Provider: 09/27/18 13:34


HPI/ROS: 





CHIEF COMPLAINT:  Atrial fibrillation





HISTORY OF PRESENT ILLNESS:  The patient is an 83-year-old female sent from Dr. Lyons's office for admission.  She has a history of atrial fibrillation and 

pacemaker.  She presented to his office complaining of mild shortness of breath 

and 2-1 flutter.  Gave her Lasix and a couple of doses of beta-blockers but her 

blood pressure dropped to 90 systolic.  He did not feel comfortable giving her 

more medicine and attempted to admit her directly but there were no beds so 

sent her to the [facility]. He is requesting admission, lab work and preparation for 

ARMAAN and cardioversion tomorrow.  The patient actually states that she is 

asymptomatic.  He denies chest pain or shortness of breath or palpitations.  

She is ambulatory.  No recent fevers or infections.  No GI symptoms.


Severity:  Severe


Modifying factors:  None





REVIEW OF SYSTEMS:


Constitutional:  denies: chills, fever, recent illness, recent injury


EENTM: denies: blurred vision, double vision, nose congestion


Respiratory: denies: cough, shortness of breath


Cardiac:  See HPI


Gastrointestinal/Abdominal: denies: abdominal pain, diarrhea, nausea, vomiting, 

blood streaked stools


Genitourinary: denies: dysuria, frequency, hematuria, pain


Musculoskeletal: denies: joint pain, muscle pain


Skin: denies: lesions, rash, jaundice, bruising


Neurological: denies: headache, numbness, paresthesia, tingling, dizziness, 

weakness


Hematologic/Lymphatic: denies: blood clots, easy bleeding, easy bruising


Immunologic/allergic: denies: HIV/AIDS, transplant


 10 systems reviewed and negative except as noted





EXAM:


GENERAL:  Well-appearing, well-nourished and in no acute distress.


HEAD:  Atraumatic, normocephalic.


EYES:  Pupils equal round and reactive to light, extraocular movements intact, 

sclera anicteric, conjunctiva are normal.


ENT:  TMs normal, nares patent, oropharynx clear without exudates.  Moist 

mucous membranes.


NECK:  Normal range of motion, supple without lymphadenopathy or JVD.


LUNGS:  Breath sounds clear to auscultation bilaterally and equal.  No wheezes 

rales or rhonchi.


HEART:  Tachycardic without murmurs, rubs or gallops.


ABDOMEN:  Soft, nontender, normoactive bowel sounds.  No guarding, no rebound.  

No masses appreciated. 


BACK:  No CVA tenderness, no spinal tenderness, step-offs or deformities


EXTREMITIES:  Normal range of motion, no pitting or edema.  No clubbing or 

cyanosis.


NEUROLOGICAL:  Cranial nerves II through XII grossly intact.  Normal speech, 

normal gait.  5/5 strength, normal movement in all extremities, normal sensation

, normal reflexes


PSYCH:  Normal mood, normal affect.


SKIN:  Warm, dry, normal turgor, no visible rashes or lesions.








Source: Patient


Exam Limitations: No limitations





- Personal History


Current Tetanus/Diphtheria Vaccine: Unsure


Current Tetanus Diphtheria and Acellular Pertussis (TDAP): Unsure


Tetanus Vaccine Date: IN LAST 10 YRS





- Medical/Surgical History


Hx Asthma: No


Hx Chronic Respiratory Disease: Yes


Hx Diabetes: No


Hx Cardiac Disease: Yes


Hx Renal Disease: Yes


Hx Cirrhosis: No


Hx Alcoholism: No


Hx HIV/AIDS: No


Hx Splenectomy or Spleen Trauma: No


Other PMH: CAD,Sleep apnea, "Balance issues", arthritis, depression, afib, stent

, weakness, Rt should joint replacement, Rt. shoulder rebuuilt, rt.upper arm 

rebuilt, back surgeries, KIDNEY DISEASE R/T HEART ISSUE





- Family History


Significant Family History: No pertinent family hx





- Social History


Smoking Status: Never smoked


Alcohol Use: Sober


Drug Use: None


Constitutional: 


 Initial Vital Signs











Temperature (C)  37.0 C   09/27/18 12:48


 


Heart Rate  130 H  09/27/18 12:48


 


Respiratory Rate  18   09/27/18 12:48


 


Blood Pressure  93/75 L  09/27/18 12:48


 


O2 Sat (%)  95   09/27/18 12:48








 











O2 Delivery Mode               Room Air














Allergies/Adverse Reactions: 


 





nickel [Nickel] Allergy (Severe, Verified 07/22/14 11:24)


 Contact Dermatitis


Penicillins Allergy (Severe, Verified 07/22/14 11:24)


 Rash


Sulfa (Sulfonamide Antibiotics) [Sulfa(Sulfonamide Antibiotics)] Allergy (Severe

, Verified 07/22/14 11:24)


 Nausea


SEASONAL Allergy (Severe, Uncoded 07/22/14 11:24)


 Rhinitis








Home Medications: 














 Medication  Instructions  Recorded


 


Cetirizine [ZyrTEC 10 mg (*)] 10 mg PO DAILY 01/24/13


 


Clopidogrel Bisulfate [Plavix (*)] 75 mg PO DAILY #0 tab 06/24/14


 


Calcium Carb/Vit D3/Minerals 1 each PO DAILY 02/22/15





[Calcium 600+D Plus Minerals Tb]  


 


Hydrocodone/Acetaminophen [Norco 1 each PO Q6 PRN 02/22/15





5/325 (*)]  


 


Digoxin [Lanoxin 0.125 mg] 0.125 mg PO DAILY10 #0 tab 03/05/15


 


Acetaminophen [Tylenol 325mg (*)] 650 mg PO Q6 PRN 08/22/16


 


Atorvastatin Calcium [Lipitor 40 40 mg PO HS 08/22/16





mg (*)]  


 


Fludrocortisone Acetate [Florinef] 0.2 mg PO DAILY 08/22/16


 


Gabapentin [Neurontin 300 MG (*)] 300 mg PO BID 08/22/16


 


LORazepam [Ativan (*)] 0.25 mg PO DAILY PRN 08/22/16


 


LORazepam [Ativan (*)] 0.25 mg PO HS 08/22/16


 


Meclizine HCl 12.5 mg PO TID PRN 08/22/16


 


Methylcellulose [Citrucel] 1,000 mg PO DAILY 08/22/16


 


Pantoprazole Sodium [Protonix 40mg 40 mg PO DAILY 08/22/16





(*)]  


 


fentaNYL [Duragesic 12 MCG Patch 12 mcg TD Q72H 08/22/16





(*)]  


 


Aspirin [Aspirin 81mg (*)] 81 mg PO DAILY 03/19/17


 


Calcium Carbonate [Tums 500MG (*)] 1,000 mg PO Q8 PRN 03/19/17


 


Dofetilide [Tikosyn 0.25 MG (*)] 0.25 mg PO BID 03/19/17


 


Loperamide HCl [Imodium 2 mg (*)] 2 mg PO Q6 PRN 03/19/17


 


Methyl Salicylate/Menth/Camph 1 each TP DAILY PRN 03/19/17





[Salonpas Large Patch]  


 


Mometasone Furoate 2 spray NS DAILY PRN 03/19/17


 


Ondansetron Odt [Zofran Odt 4 mg 4 mg PO Q8 PRN 03/19/17





(*)]  


 


Potassium Chloride [Klor-Con] 20 meq PO BID 03/19/17


 


Sertraline HCl [Zoloft 100mg (*)] 100 mg PO DAILY 03/19/17


 


celeCOXIB [Celebrex (*)] 200 mg PO DAILY 03/19/17


 


guaiFENesin [Mucinex 600 MG (*)] 600 mg PO BID PRN 03/19/17


 


Metoprolol Tartrate [Lopressor 25 12.5 mg PO BID #60 tab 03/23/17





mg (*)]  














Medical Decision Making





- Diagnostics


EKG Interpretation: 





An EKG obtained and was read and documented in trace view.  Please see trace 

view for full reading and report.  Atrial flutter with up apparentcy, similar 

to previous 


Imaging Results: 


 Imaging Impressions





Chest X-Ray  09/27/18 13:37


Impression: Stable chest.











ED Course/Re-evaluation: 





1:50 p.m. I discussed the case with Dr. Lam who will admit to the medical 

service.


Differential Diagnosis: 





Partial list of the Differential diagnosis considered include but were not 

limited to;  atrial fibrillation, arrhythmia, head injury and although unlikely 

based on the history and physical exam, I also considered infection, pneumonia, 

acute coronary disease. 


Critical Care Time: 





Critical care time spent by me, Dr. Titus exclusive with this patient was 35 

minutes, exclusive of the PA time exclusive of procedures.  The organ system 

that was at risk was cardiovascular and I gave consultation, admission, 

medications to prevent worsening of the patient's condition





- Data Points


Laboratory Results: 


 Laboratory Results





 09/27/18 13:22 





 09/27/18 13:22 





 











  09/27/18 09/27/18 09/27/18





  13:22 13:22 13:22


 


WBC      6.91 10^3/uL 10^3/uL





     (3.80-9.50) 


 


RBC      4.19 10^6/uL 10^6/uL





     (4.18-5.33) 


 


Hgb      11.1 g/dL L g/dL





     (12.6-16.3) 


 


Hct      34.8 % L %





     (38.0-47.0) 


 


MCV      83.1 fL fL





     (81.5-99.8) 


 


MCH      26.5 pg L pg





     (27.9-34.1) 


 


MCHC      31.9 g/dL L g/dL





     (32.4-36.7) 


 


RDW      17.0 % H %





     (11.5-15.2) 


 


Plt Count      162 10^3/uL 10^3/uL





     (150-400) 


 


MPV      9.8 fL fL





     (8.7-11.7) 


 


Neut % (Auto)      54.2 % %





     (39.3-74.2) 


 


Lymph % (Auto)      33.3 % %





     (15.0-45.0) 


 


Mono % (Auto)      8.0 % %





     (4.5-13.0) 


 


Eos % (Auto)      3.5 % %





     (0.6-7.6) 


 


Baso % (Auto)      0.9 % %





     (0.3-1.7) 


 


Nucleat RBC Rel Count      0.0 % %





     (0.0-0.2) 


 


Absolute Neuts (auto)      3.75 10^3/uL 10^3/uL





     (1.70-6.50) 


 


Absolute Lymphs (auto)      2.30 10^3/uL 10^3/uL





     (1.00-3.00) 


 


Absolute Monos (auto)      0.55 10^3/uL 10^3/uL





     (0.30-0.80) 


 


Absolute Eos (auto)      0.24 10^3/uL 10^3/uL





     (0.03-0.40) 


 


Absolute Basos (auto)      0.06 10^3/uL 10^3/uL





     (0.02-0.10) 


 


Absolute Nucleated RBC      0.00 10^3/uL 10^3/uL





     (0-0.01) 


 


Immature Gran %      0.1 % %





     (0.0-1.1) 


 


Immature Gran #      0.01 10^3/uL 10^3/uL





     (0.00-0.10) 


 


PT    16.7 SEC H SEC  





    (12.0-15.0)  


 


INR    1.33  H   





    (0.83-1.16)  


 


APTT    31.0 SEC SEC  





    (23.0-38.0)  


 


Sodium  138 mEq/L mEq/L    





   (135-145)   


 


Potassium  4.5 mEq/L mEq/L    





   (3.3-5.0)   


 


Chloride  99 mEq/L mEq/L    





   ()   


 


Carbon Dioxide  26 mEq/l mEq/l    





   (22-31)   


 


Anion Gap  13 mEq/L mEq/L    





   (8-16)   


 


BUN  43 mg/dL H mg/dL    





   (7-23)   


 


Creatinine  1.9 mg/dL H mg/dL    





   (0.6-1.0)   


 


Estimated GFR  25     





    


 


Glucose  95 mg/dL mg/dL    





   ()   


 


Calcium  9.2 mg/dL mg/dL    





   (8.5-10.4)   


 


Magnesium  2.1 mg/dL mg/dL    





   (1.6-2.3)   


 


Troponin I  0.014 ng/mL ng/mL    





   (0.000-0.034)   


 


NT-Pro-B Natriuret Pep  7720 pg/mL H pg/mL    





   (0-450)   


 


TSH  2.520 uIU/mL uIU/mL    





   (0.465-4.680)   











Medications Given: 


 








Discontinued Medications





Enoxaparin Sodium (Lovenox)  80 mg SC EDNOW ONE


   Stop: 09/27/18 13:49


   Last Admin: 09/27/18 14:07 Dose:  80 mg








Departure





- Departure


Disposition: [facility] Inpatient Acute


Clinical Impression: 


Atrial fibrillation


Qualifiers:


 Atrial fibrillation type: persistent Qualified Code(s): I48.1 - Persistent 

atrial fibrillation





Condition: Fair

## 2018-09-27 NOTE — PDGENHP
History and Physical





- Chief Complaint


Acute fatigue





- History of Present Illness


Primary care provider:  Dr. Anita Wang


Primary cardiologist:  Dr. Darryn Lyons





HPI:  83-year-old female presents with acutely worsening fatigue characterized 

as spending approximately 18 hr in bed per day with associated dizziness, 

recurrent falls.  The patient reports onset of her fatigue and increased falls 

approximately 6 months ago with persistent duration, resulting in approximately 

a fall every other month.  Patient reports her most recent fall was 9/21, but 

she denies experiencing significant trauma.  She reports approximately 10 lb 

weight gain over the past 3 months and she has been taking her home medications 

as prescribed.  She reports that she drinks a significant amount of fluid on a 

daily basis, but she also urinates heavily as result of her diuretic.  She 

presented to [facility] for evaluation of these issues, and was found 

to be in rapid atrial fibrillation with a heart rate around 130, and was 

triaged to the emergency department after receiving additional doses of oral 

metoprolol.  In the emergency department, her heart rate was in the 130s in her 

systolic blood pressures in the 90s.





History Information





- Allergies/Home Medication List


Allergies/Adverse Reactions: 








nickel [Nickel] Allergy (Severe, Verified 07/22/14 11:24)


 Contact Dermatitis


Penicillins Allergy (Severe, Verified 07/22/14 11:24)


 Rash


Sulfa (Sulfonamide Antibiotics) [Sulfa(Sulfonamide Antibiotics)] Allergy (Severe

, Verified 07/22/14 11:24)


 Nausea


SEASONAL Allergy (Severe, Uncoded 07/22/14 11:24)


 Rhinitis





Home Medications: 








Mometasone Furoate 2 spray NS DAILY PRN 03/19/17 [Last Taken Unknown]


Acetaminophen [Tylenol 325mg (*)] 650 mg PO Q6 PRN 09/27/18 [Last Taken Unknown]


Acetaminophen [Tylenol  mg (*)] 500 mg PO BID 09/27/18 [Last Taken 09/27/ 18 09:00]


Apixaban [Eliquis] 2.5 mg PO BID 09/27/18 [Last Taken 09/27/18 09:00]


Aspirin EC [Aspirin EC 81 mg (*)] 81 mg PO DAILY 09/27/18 [Last Taken 09/27/18]


Atorvastatin Calcium [Lipitor 40 mg (*)] 40 mg PO HS 09/27/18 [Last Taken 09/26/ 18]


Bumetanide [Bumex (*)] 1 mg PO Q2D@0930 09/27/18 [Last Taken 09/27/18]


Calcium Carb W/Vit D [Calcium Carb W/Vit D 500/200 (*)] 500 mg PO HS 09/27/18 [

Last Taken 09/26/18]


Cetirizine [ZyrTEC 10 mg (*)] 10 mg PO HS 09/27/18 [Last Taken 09/26/18]


Cyanocobalamin [Vitamin B12 1000MCG/ML (*)] 1,000 mcg IM FR 09/27/18 [Last 

Taken 09/21/18]


Ferrous Sulfate [Ferrous Sulf 325 MG (*)] 325 mg PO DAILY 09/27/18 [Last Taken 

09/27/18]


Furosemide [Lasix 40 MG (*)] 40 mg PO BID@09,14 09/27/18 [Last Taken 09/27/18 14

:00]


Gabapentin [Neurontin 300 MG (*)] 300 mg PO BID 09/27/18 [Last Taken 09/27/18 09

:00]


Hydrocodone/Acetaminophen [Norco 5/325 (*)] 1 each PO Q4 PRN 09/27/18 [Last 

Taken 09/27/18]


LORazepam [Ativan (*)] 0.25 mg PO DAILY PRN 09/27/18 [Last Taken Unknown]


LORazepam [Ativan (*)] 0.25 mg PO HS 09/27/18 [Last Taken 09/26/18]


Lidocaine 4%/Menthol 1% [Icy Hot Lidocaine/Menthol 4%/1% Patch (*)] 1 patch TD 

DAILY PRN 09/27/18 [Last Taken Unknown]


Loperamide HCl [Imodium 2 mg (*)] 2 mg PO PRN PRN 09/27/18 [Last Taken 09/24/18]


Magnesium Hydroxide [Milk of Magnesia] 30 ml PO DAILY PRN 09/27/18 [Last Taken 

Unknown]


Metoprolol Tartrate [Lopressor 50 mg (*)] 50 mg PO BID 09/27/18 [Last Taken 09/ 27/18 09:00]


Mirabegron [Myrbetriq] 50 mg PO HS 09/27/18 [Last Taken 09/26/18]


Pantoprazole Sodium [Protonix 40mg (*)] 40 mg PO DAILY 09/27/18 [Last Taken 09/ 27/18]


Potassium Chloride 20 meq PO BID 09/27/18 [Last Taken 09/27/18 09:00]


Sennosides/Docusate Sodium [Senokot-S (OTC)] 1 each PO BID 09/27/18 [Last Taken 

09/26/18]


Sertraline HCl [Zoloft 100mg (*)] 100 mg PO DAILY 09/27/18 [Last Taken 09/27/18]


Simethicone [Mylicon] 80 mg PO BID PRN 09/27/18 [Last Taken Unknown]


Sodium Cl Nasal [Ocean Spray (*)] 1 spray NS DAILY PRN 09/27/18 [Last Taken 

Unknown]


fentaNYL [Duragesic 12 MCG Patch (*)] 12 mcg TD Q72H 09/27/18 [Last Taken 09/26/ 18]


traZODone [traZODONE 50MG (*)] 25 mg PO HS 09/27/18 [Last Taken 09/26/18]





I have personally reviewed and updated: family history, medical history, social 

history, surgical history





- Past Medical History


Additional medical history: Atrial fibrillation with aberrancy.  Chronic kidney 

disease stage 3 with baseline creatinine 1.1-1.3.  Coronary artery disease 

status post PCI to the RCA and the LAD.  Continuous opiate dependency.  Mild 

cognitive impairment verses chronic dementia.  Orthostatic hypotension





- Surgical History


Additional surgical history: Permanent pacemaker.  Cardiac stents.  Right 

shoulder and arm surgery





- Family History


Additional family history: Mother with atrial fibrillation





- Social History


Smoking Status: Never smoked


Alcohol Use: Sober


Drug Use: None


Additional social history: Resides at [facility]





Review of Systems


Review of Systems: 





ROS: 10pt was reviewed & negative except for what was stated in HPI & below


Constitutional: Reports: weakness, other (Fatigue)


Respiratory: Reports: shortness of breath


Neurological: Reports: other (Dizziness)





Physical Exam


Physical Exam: 

















Temp Pulse Resp BP Pulse Ox


 


 36.5 C   99   17   90/63 L  92 


 


 09/27/18 15:23  09/27/18 15:23  09/27/18 15:23  09/27/18 15:23  09/27/18 15:23




















O2 (L/minute)                  2.5














Constitutional: no apparent distress, not in pain, other (Aged appearing), No 

uncomfortable


Eyes: PERRL, anicteric sclera, EOMI


Ears, Nose, Mouth, Throat: moist mucous membranes, hearing normal, ears appear 

normal, no oral mucosal ulcers


Cardiovascular: irregularly irregular, tachycardia, No systolic murmur, No JVD, 

No edema


Respiratory: no respiratory distress, no rales or rhonchi, clear to auscultation


Gastrointestinal: normoactive bowel sounds, soft, non-tender abdomen, no 

palpable masses


Genitourinary: no bladder fullness, no bladder tenderness


Neurologic: AAOx3, sensation intact bilaterally, No weakness


Psychiatric: interacting appropriately, not anxious, not encephalopathic, 

thought process linear





Lab Data & Imaging Review





 09/27/18 13:22





 09/27/18 13:22














WBC  6.91 10^3/uL (3.80-9.50)   09/27/18  13:22    


 


RBC  4.19 10^6/uL (4.18-5.33)   09/27/18  13:22    


 


Hgb  11.1 g/dL (12.6-16.3)  L  09/27/18  13:22    


 


Hct  34.8 % (38.0-47.0)  L  09/27/18  13:22    


 


MCV  83.1 fL (81.5-99.8)   09/27/18  13:22    


 


MCH  26.5 pg (27.9-34.1)  L  09/27/18  13:22    


 


MCHC  31.9 g/dL (32.4-36.7)  L  09/27/18  13:22    


 


RDW  17.0 % (11.5-15.2)  H  09/27/18  13:22    


 


Plt Count  162 10^3/uL (150-400)   09/27/18  13:22    


 


MPV  9.8 fL (8.7-11.7)   09/27/18  13:22    


 


Neut % (Auto)  54.2 % (39.3-74.2)   09/27/18  13:22    


 


Lymph % (Auto)  33.3 % (15.0-45.0)   09/27/18  13:22    


 


Mono % (Auto)  8.0 % (4.5-13.0)   09/27/18  13:22    


 


Eos % (Auto)  3.5 % (0.6-7.6)   09/27/18  13:22    


 


Baso % (Auto)  0.9 % (0.3-1.7)   09/27/18  13:22    


 


Nucleat RBC Rel Count  0.0 % (0.0-0.2)   09/27/18  13:22    


 


Absolute Neuts (auto)  3.75 10^3/uL (1.70-6.50)   09/27/18  13:22    


 


Absolute Lymphs (auto)  2.30 10^3/uL (1.00-3.00)   09/27/18  13:22    


 


Absolute Monos (auto)  0.55 10^3/uL (0.30-0.80)   09/27/18  13:22    


 


Absolute Eos (auto)  0.24 10^3/uL (0.03-0.40)   09/27/18  13:22    


 


Absolute Basos (auto)  0.06 10^3/uL (0.02-0.10)   09/27/18  13:22    


 


Absolute Nucleated RBC  0.00 10^3/uL (0-0.01)   09/27/18  13:22    


 


Immature Gran %  0.1 % (0.0-1.1)   09/27/18  13:22    


 


Immature Gran #  0.01 10^3/uL (0.00-0.10)   09/27/18  13:22    


 


PT  16.7 SEC (12.0-15.0)  H  09/27/18  13:22    


 


INR  1.33  (0.83-1.16)  H  09/27/18  13:22    


 


APTT  31.0 SEC (23.0-38.0)   09/27/18  13:22    


 


Sodium  138 mEq/L (135-145)   09/27/18  13:22    


 


Potassium  4.5 mEq/L (3.3-5.0)   09/27/18  13:22    


 


Chloride  99 mEq/L ()   09/27/18  13:22    


 


Carbon Dioxide  26 mEq/l (22-31)   09/27/18  13:22    


 


Anion Gap  13 mEq/L (8-16)   09/27/18  13:22    


 


BUN  43 mg/dL (7-23)  H  09/27/18  13:22    


 


Creatinine  1.9 mg/dL (0.6-1.0)  H  09/27/18  13:22    


 


Estimated GFR  25   09/27/18  13:22    


 


Glucose  95 mg/dL ()   09/27/18  13:22    


 


Calcium  9.2 mg/dL (8.5-10.4)   09/27/18  13:22    


 


Magnesium  2.1 mg/dL (1.6-2.3)   09/27/18  13:22    


 


POC Troponin I  0.00 ng/mL (0.00-0.08)   09/27/18  13:52    


 


Troponin I  0.014 ng/mL (0.000-0.034)   09/27/18  13:22    


 


NT-Pro-B Natriuret Pep  7720 pg/mL (0-450)  H  09/27/18  13:22    


 


TSH  2.520 uIU/mL (0.465-4.680)   09/27/18  13:22    








Visualized and Interpreted Chest x-ray results: Yes


Chest X-Ray results: no infiltrate, other (Pacemaker in place)


Visualized and Interpreted EKG results: Yes


EKG Interpretation: Positive for: other (Atrial fibrillation with left bundle 

branch block)





Assessment & Plan


Assessment: 








83-year-old female presents with atrial fibrillation and acute rapid 

ventricular response most likely secondary to hypovolemia








Plan: 





1.  Atrial fibrillation with acute rapid ventricular response.  New problem 

this provider, further workup indicated.  Patient has a known history of AFib 

with aberrancy and she has reportedly been a sinus mechanism at baseline, but 

her recurrent falls with concomitant dizziness big question whether the patient 

is experiencing paroxysmal atrial fibrillation which is symptomatic


-she has become hypotensive status post beta-blocker as well as her home 

medications which include digoxin


-discussed with Dr. Titus was discussed with Dr. Lyons, he recommends holding 

additional rate control tonight, cardioversion tomorrow


-will monitor on telemetry, monitor for any symptoms of chest pain, check 

troponin and BNP to ensure no resultant diastolic CHF


-will give IV normal saline as I believe the patient is somewhat hypovolemic 

from on monitored diuretic use


-discussed with Dr. Dolores Mitchell, she will arrange for cardioversion tomorrow, I 

have administered 1 dose of Lovenox in the emergency department in the setting 

of the patient's renal failure, this 1 does should last the patient throughout 

the course of the procedure time tomorrow





2. Acute kidney injury on chronic kidney disease stage 3. Most likely secondary 

to hypovolemia with no evidence of CHF on chest x-ray, BNP is most likely 

secondary to cardiac strain from ongoing AFib


-give normal saline, monitor urine output, repeat serum creatinine level in a.m.





3. Mild cognitive impairment.  Patient is currently mentating well, she is 

interactive and following conversation, providing history, monitor for any 

signs of sundowning





4. Coronary artery disease.  Continue home medications once reconciled


-reviewed outside records including 3/21/2017 most recent hospitalization DC 

Summary by Dr. Bro Scales, recounts patient's most recent Lexiscan stress test 

which demonstrated previous infarct but no ischemia, discharged home on aspirin

, statin, beta-blocker, Plavix 





5. Orthostatic hypotension.  Chronic, give dose of Florinef





Diet.  Renal, NPO after midnight





Prophylaxis.  High risk patient, received 1 dose of therapeutic Lovenox will 

require ongoing anticoagulation moving forward





Code.  Do not resuscitate per patient, daughter is MD POA





Disposition.  Anticipated discharge 09/28, pending stable rhythm monitoring 

tomorrow postprocedure.

## 2018-09-27 NOTE — CPEKG
Test Reason : OPEN

Blood Pressure : ***/*** mmHG

Vent. Rate : 128 BPM     Atrial Rate : 000 BPM

   P-R Int : 086 ms          QRS Dur : 157 ms

    QT Int : 403 ms       P-R-T Axes : 000 -65 110 degrees

   QTc Int : 588 ms

 

Junctional tachycardia

Left bundle branch block

Inferior infarct, acute (RCA)

Probable RV involvement, suggest recording right precordial leads

 

Confirmed by Kali Titus ([id number]) on 9/27/2018 1:34:47 PM

 

Referred By:             Confirmed By:Kali Titus

## 2018-09-28 PROCEDURE — 5A2204Z RESTORATION OF CARDIAC RHYTHM, SINGLE: ICD-10-PCS | Performed by: INTERNAL MEDICINE

## 2018-09-28 PROCEDURE — B245ZZ4 ULTRASONOGRAPHY OF LEFT HEART, TRANSESOPHAGEAL: ICD-10-PCS | Performed by: INTERNAL MEDICINE

## 2018-09-28 RX ADMIN — DOCUSATE SODIUM AND SENNOSIDES SCH TAB: 50; 8.6 TABLET ORAL at 19:25

## 2018-09-28 RX ADMIN — Medication SCH MG: at 19:25

## 2018-09-28 RX ADMIN — APIXABAN SCH MG: 2.5 TABLET, FILM COATED ORAL at 22:01

## 2018-09-28 RX ADMIN — AMIODARONE HYDROCHLORIDE SCH MG: 200 TABLET ORAL at 16:25

## 2018-09-28 RX ADMIN — SODIUM CHLORIDE SCH MLS: 900 INJECTION, SOLUTION INTRAVENOUS at 18:13

## 2018-09-28 RX ADMIN — METOPROLOL TARTRATE SCH MG: 50 TABLET, FILM COATED ORAL at 19:25

## 2018-09-28 RX ADMIN — IRON SUPPLEMENT SCH: 325 TABLET ORAL at 18:07

## 2018-09-28 RX ADMIN — ACETAMINOPHEN SCH MG: 500 TABLET ORAL at 19:24

## 2018-09-28 RX ADMIN — CETIRIZINE HYDROCHLORIDE SCH MG: 10 TABLET, FILM COATED ORAL at 19:24

## 2018-09-28 RX ADMIN — ASPIRIN SCH MG: 81 TABLET, DELAYED RELEASE ORAL at 10:51

## 2018-09-28 RX ADMIN — DOCUSATE SODIUM AND SENNOSIDES SCH: 50; 8.6 TABLET ORAL at 10:04

## 2018-09-28 RX ADMIN — METOPROLOL TARTRATE SCH: 50 TABLET, FILM COATED ORAL at 13:11

## 2018-09-28 RX ADMIN — SERTRALINE HYDROCHLORIDE SCH: 100 TABLET ORAL at 18:07

## 2018-09-28 RX ADMIN — APIXABAN SCH: 2.5 TABLET, FILM COATED ORAL at 10:03

## 2018-09-28 RX ADMIN — ATORVASTATIN CALCIUM SCH MG: 40 TABLET, FILM COATED ORAL at 19:25

## 2018-09-28 RX ADMIN — APIXABAN SCH MG: 2.5 TABLET, FILM COATED ORAL at 10:52

## 2018-09-28 RX ADMIN — HYDROCODONE BITARTRATE AND ACETAMINOPHEN PRN TAB: 5; 325 TABLET ORAL at 19:32

## 2018-09-28 RX ADMIN — SODIUM CHLORIDE SCH MLS: 900 INJECTION, SOLUTION INTRAVENOUS at 03:44

## 2018-09-28 RX ADMIN — ACETAMINOPHEN SCH: 500 TABLET ORAL at 13:00

## 2018-09-28 RX ADMIN — PANTOPRAZOLE SODIUM SCH MG: 40 TABLET, DELAYED RELEASE ORAL at 10:51

## 2018-09-28 NOTE — PDTEE1
ARMAAN Cardioversion Procedure


Procedure: electrical cardioversion, transesophageal echo


Indications: other (atrial flutter and VT)


Consent: signed and in chart


Anticoagulation: eliquis


Procedural Details: 


Sedation was provided by Dr. Olivia.   Pads were placed in anterior-posterior 

position.  ARMAAN probe was advanced and standard images obtained.  There is no 

evidence of left atrial or left atrial appendage thrombus.





Results: other (AV paced rhythm.  Sustained ventricular tachycardia.)


Conclusions: successful ARMAAN cardioversion


Conclusion Comment: The patient received a total of 4 synchronized shocks.  Her 

initial rhythm was atrial flutter with 2-1 conduction.  After the 1st 200 joule 

shock she converted to A sensed V paced rhythm.  Within a minute she went in to 

ventricular tachycardia.  This was confirmed with device interrogation.  We 

delivered an additional 200 joule synchronized shock which converted her back 

into AV paced rhythm.  Shortly after this she went into ventricular tachycardia 

again.  At this point we gave her 150 mg of IV amiodarone, started IV Magnesium

,  and delivered a 3rd synchronized shock.  Again she had 1 more episode of 

ventricular tachycardia.  We gave her an additional 150 mg of IV amiodarone and 

a 4th synchronized shock.  A the end of the procedure her Biotronik pacemaker 

was interrogated again.  Normal impedances.  Her AV delay was shortened to 200 

milliseconds to encourage RV pacing.  Long discussion with the patient, her 

daughter, and Dr. Lam.  The patient will be full code during her hospital 

stay as we would like to be able to manage her ventricular arrhythmias.  She 

will be started on oral amiodarone.  Her ejection fraction was 30% on 

echocardiogram which may be related to her longstanding rapid atrial flutter.  

However, this will need to be closely followed up as an outpatient with 

consideration for biventricular ICD if this is the patient's wishes.


Patient Problems: 


 Problems











Problem Status Onset


 


Humerus shaft fracture Acute  


 


CAD (coronary artery disease) Acute  


 


Obstructive sleep apnea Acute  


 


Elevated troponin Acute  


 


Weakness Acute  


 


Acute coronary syndrome Acute  


 


Atrial fibrillation Acute

## 2018-09-28 NOTE — PDCARPN
Cardiology Progress Note


Assessment/Plan: 


Assessment/Plan:  83-year-old female with paroxysmal atrial fibrillation and 

now atrial flutter.  She has a history of coronary disease with RCA stenting in 

2017 for non ST elevation M I. She has sick sinus syndrome with a Biotronik 

pacemaker, chronic renal insufficiency with worsening creatinine this admission

, chronic opiate use, orthostatic hypotension, history of falls.  She is 

admitted from [facility] with symptomatic and rapid atrial flutter and 

evidence of volume overload.





1. Atrial flutter:  This is confirmed with device interrogation.  She is 

currently on Eliquis.  Plan for ARMAAN guided cardioversion this afternoon.  

Continue Eliquis for at least 1 month, although I am not sure this is a good 

long-term medication for her given frequent falls.  She could also be evaluated 

at the [facility] by Dr. Sierra for consideration of Watchman 

device.  Her CHADS2 Vasc score is 5 which also puts her at high thromboembolic 

risk.  Will start oral amiodarone after her cardioversion.  She was previously 

on digoxin and dofetilide which were stopped a few months ago.





2. Coronary disease:  Negative troponins here.  I think her symptoms are 

largely related to uncontrolled atrial arrhythmia.  Could consider outpatient 

nuclear stress test.





3. Biotronik pacemaker:  Will reinterrogate device post cardioversion.





4. Chronic renal insufficiency with worsening creatinine here.  This may be 

hypoperfusion related to rapid atrial flutter.  Follow closely.





5. Heart failure:  Will re-evaluate ejection fraction today.  Continue beta-

blockers.





6. History of frequent falls:  As per 1., may not be a good long-term Eliquis 

candidate.  Refer to structural [facility].











09/28/18 13:25





Subjective: 





She is having some chest pressure with radiation down both arms.  She has felt 

poorly and short of breath for several months.  We reviewed, with her daughter, 

the fact that she has, with previous procedures, required large amounts of 

sedation but then took a long time to recover from sedation or general 

anesthesia.  She does have a history of alcoholism and chronic opiate 

dependency.


Reviewed/Discussed With: family, hospitalist


Objective: 





 Vital Signs (8 Hrs)











  Temp Pulse Resp BP Pulse Ox


 


 09/28/18 12:48   135 H   110/84 H 


 


 09/28/18 11:22  36.9 C  107 H  15  116/88 H  90 L


 


 09/28/18 07:33  36.6 C  80  18  115/69  95








 Intake/Output (24 Hrs)











 09/27/18 09/28/18 09/29/18





 05:59 05:59 05:59


 


Intake Total  3059 


 


Output Total   550


 


Balance  3059 -550


 


Intake:   


 


  Oral (ml)  600 


 


  IV Infused (ml)  2459 


 


    Ns 1,000 ml @ 150 mls/hr  2459 





    IV CONT KELLY Rx#:   





    [id number]   


 


Output:   


 


  Urine (ml)   550


 


    Toilet   550


 


Other:   


 


  Weight  70.3 kg 


 


  Number of Voids   


 


    Toilet  1 


 


  Number of Stools   


 


    Toilet  1 








Appears frail














Chest x-ray reviewed:  Stable pacemaker position.  No effusion or infiltrate.


Result Diagrams: 


 09/27/18 13:22





 09/28/18 03:32


Cardiac Labs: 





 Cardiac Lab Results (72 Hrs)











  09/28/18





  03:32


 


Troponin I  0.014











EKG: 





Atrial flutter with left bundle branch block


Telemetry: 





Atrial flutter with bundle branch block.  Heart rate 120-140 beats per minute





ICD10 Worksheet


Patient Problems: 


 Problems











Problem Status Onset


 


Atrial fibrillation Acute  


 


Acute coronary syndrome Acute  


 


CAD (coronary artery disease) Acute  


 


Elevated troponin Acute  


 


Humerus shaft fracture Acute  


 


Obstructive sleep apnea Acute  


 


Weakness Acute

## 2018-09-28 NOTE — HOSPPROG
Hospitalist Progress Note


Assessment/Plan: 


Assessment: 





83-year-old female presents with atrial fibrillation and flutter w/ acute rapid 

ventricular response and hypotension c/b acute, recurrent ventricular 

tachycardia





Plan: 








# Ventricular tachycardia. Acute, recurrent despite 4 shocks in CVC today by 

Dr. Mitchell, unclear precipitant, occurred s/p DCCV for aflutter


-loaded w/ amio IV in CVC, give PO dosing starting tonight


-cont bblocker


-s/p 2g magnesium IV in CVC, check level in AM, K 4.3


-d/w Dr. Mitchell and ICU charge RN, agree that patient most appropriate for ICU 

level status for close tele monitoring


-patient's monthly falls could be 2/2 occasional, symptomatic Vtach, if 

episodes self-limited


-if any rhythm Q's o/n, please contact Cardiology on call





# Atrial fibrillation and flutter with acute rapid ventricular response. 

Discussed pre-DCCV tele w/ Dr. Mitchell, she indicates that flutter waves were 

occasionally undetected by PPM and, consequently, PPM was occasioning firing 

despite ongoing RVR


-intermittently in and out of afib in CVC


-cont amio/metop


-cont eliquis


-currently no e/o CHF from arrhythmia





# UTI. Dysuria + positive UA, unclear if contributing to above


-D#1 CTX


-monitor UCx and WBC





# Acute kidney injury on chronic kidney disease stage 3. Most likely secondary 

to hypovolemia, BNP is most likely secondary to cardiac strain from ongoing AFib


-cont normal saline, monitor urine output, repeat serum creatinine level in a.m.





# Mild cognitive impairment.  Patient is currently mentating well, she is 

interactive and following conversation, providing history, monitor for any 

signs of sundowning





# Coronary artery disease.  Continue home medications once reconciled





# Orthostatic hypotension.  Chronic, previously on florinef, consider if 

symptomatically hypotensive





Diet.  Cardiac





Prophylaxis.  High risk patient, eliquis





Code.  Daughter MDPOA, code status adjusted to Full (from DNR) per goals of 

care convo w/ patient and daughter 9/28, since patient's Vtach could result in 

witnessed cardiac arrest which could be properly treated w/ CPR/Defib; patient 

just does not want to have poor quality of life if an event were to occur, and 

she and her daughter have had extensive goal convos in past such that her 

daughter is in touch with when it would be appropriate to adjust to comfort care

, if that situation were to arise





Disposition.  Anticipated discharge uncertain, upgraded to inpatient admission 

status for reasonable medical necessity (unstable vtach) w/ anticipated LOS > 

48hrs, requiring ICU level care. She is currently critically ill w/ high risk 

for worsening morbidity and/or mortality, and I spent 45 minutes of critical 

care time w/ this patient today, specifically addressing her unstable Vtach in 

CVC, coordinating w/ Dr. Mitchell and facilitating transfer w/ ICU/PCU RNs. 





Subjective: patient denies chest pain, reports dysuria


Objective: 


 Vital Signs











Temp Pulse Resp BP Pulse Ox


 


 36.9 C   135 H  15   110/84 H  90 L


 


 09/28/18 11:22  09/28/18 14:10  09/28/18 14:10  09/28/18 14:10  09/28/18 14:10








 Laboratory Results





 09/28/18 03:32 





 











 09/27/18 09/28/18 09/29/18





 05:59 05:59 05:59


 


Intake Total  3059 


 


Output Total   550


 


Balance  3059 -550








 











PT  16.7 SEC (12.0-15.0)  H  09/27/18  13:22    


 


INR  1.33  (0.83-1.16)  H  09/27/18  13:22    














- Physical Exam


Constitutional: not in pain, other (ged appearing), No uncomfortable


Cardiovascular: systolic murmur (II/VI apex), irregularly irregular, tachycardia

, No edema


Respiratory: no respiratory distress, no rales or rhonchi, clear to auscultation


Gastrointestinal: normoactive bowel sounds, soft, non-tender abdomen, no 

palpable masses


Genitourinary: other (bladder tenderness/fullness)


Neurologic: AAOx3, No weakness (motor 5/5 bilat LE)


Psychiatric: interacting appropriately, not encephalopathic, flat affect, No 

agitated





ICD10 Worksheet


Patient Problems: 


 Problems











Problem Status Onset


 


Humerus shaft fracture Acute  


 


CAD (coronary artery disease) Acute  


 


Obstructive sleep apnea Acute  


 


Elevated troponin Acute  


 


Weakness Acute  


 


Acute coronary syndrome Acute  


 


Atrial fibrillation Acute

## 2018-09-28 NOTE — ASMTLACE
SUSAN

 

Comorbidities - select        Answers:  Coronary Artery Disease               

all that apply                                                                

                                        Dementia                              

                                        History of falls                      

                                        Mild liver or renal                   

                                        disease                               

                                        Opioid dependence                     

                                        / Chronic pain                        

# of Emergency department     Answers:  1-2                                   

visits in the last 6                                                          

months                                                                        

Score: 15

 

Date Signed:  09/28/2018 10:38 AM

Electronically Signed By:Liz Garcia RN

## 2018-09-28 NOTE — PDMN
Medical Necessity


Medical necessity: Change to IP, as of 9/28/18, per MD & MCG M-575; los >2 mn 

for management of unstable vtach (s/p DCCV for aflutter), UTI & acute kidney 

injury; pt critically ill w/high risk for worsening morbidity/mortality; 

requiring close ICU monitoring, IVFs, IV Amiodarone & IV abx; comorbid advanced 

age, AFIB w/RVR on AC, NSTEMI, CKD, CAD, SSS w/pacer, orthostatic hypotension, 

mild cognitive impairment vs chronic dementia, falls

## 2018-09-28 NOTE — PDANEPAE
ANE History of Present Illness





82 yo for patel/cv for afib





ANE Past Medical History





- Cardiovascular History


Hx Hypertension: No


Hx Arrhythmias: Yes


Hx Coronary Artery / Peripheral Vascular Disease: Yes


Hx CHF / Valvular Disease: No


Cardiovascular History Comment: BP RUNS LOW.  AFIB.  HYPERLIPIDEMIA





- Pulmonary History


Hx COPD: No


Hx Asthma/Reactive Airway Disease: No


Hx Recent Upper Respiratory Infection: No


Hx Oxygen in Use at Home: Yes


O2 in Use at Home (L/minute): 2


Hx Sleep Apnea: Yes





- Neurologic History


Hx Cerebrovascular Accident: No


Hx Seizures: No


Hx Dementia: No





- Endocrine History


Hx Diabetes: No





- Renal History


Hx Renal Disorders: Yes


Renal History Comment: CHRONIC KIDNEY DISEASE.  HYPONATREMIA.  CHRONIC UTI'S





- Liver History


Hx Hepatic Disorders: No





- Neurological & Psychiatric Hx


Hx Neurological and Psychiatric Disorders: Yes


Neurological / Psychiatric History Comment: ANXIETY AND DEPRESSION.  HX OF 

ALCHOLISM





- Cancer History


Hx Cancer: No





- Congenital Disorder History


Hx Congenital Disorders: No





- GI History


Hx Gastrointestinal Disorders: Yes


Gastrointestinal History Comment: HX OF GI BLEED.  ULCERS





- Other Health History


Other Health History: NONE





- Chronic Pain History


Chronic Pain: Yes





- Surgical History


Prior Surgeries: CARDIAC CATH WITH STENT PLACEMENT IN 2008.  TONGUE CYST 

REMOVAL.  2 BACK SURGERIES 1ST- 40 YEARS AGO AND 2ND- 5 YEARS AGO.  SHOULDER 

REPLACEMENT 2009 RIGHT.  EGD SCOPE FOR GI BLEED 2013





ANE Review of Systems


Review of Systems: 








- Exercise capacity


METS (RN): 3 METS





ANE Patient History





- Allergies


Allergies/Adverse Reactions: 








nickel [Nickel] Allergy (Severe, Verified 07/22/14 11:24)


 Contact Dermatitis


Penicillins Allergy (Severe, Verified 07/22/14 11:24)


 Rash


Sulfa (Sulfonamide Antibiotics) [Sulfa(Sulfonamide Antibiotics)] Allergy (Severe

, Verified 07/22/14 11:24)


 Nausea


SEASONAL Allergy (Severe, Uncoded 07/22/14 11:24)


 Rhinitis








- Home Medications


Home medications: home medication list seen and reviewed


Home Medications: 








Mometasone Furoate 2 spray NS DAILY PRN 03/19/17 [Last Taken Unknown]


Acetaminophen [Tylenol 325mg (*)] 650 mg PO Q6 PRN 09/27/18 [Last Taken Unknown]


Acetaminophen [Tylenol  mg (*)] 500 mg PO BID 09/27/18 [Last Taken 09/27/ 18 09:00]


Apixaban [Eliquis] 2.5 mg PO BID 09/27/18 [Last Taken 09/27/18 09:00]


Aspirin EC [Aspirin EC 81 mg (*)] 81 mg PO DAILY 09/27/18 [Last Taken 09/27/18]


Atorvastatin Calcium [Lipitor 40 mg (*)] 40 mg PO HS 09/27/18 [Last Taken 09/26/ 18]


Bumetanide [Bumex (*)] 1 mg PO Q2D@0930 09/27/18 [Last Taken 09/27/18]


Calcium Carb W/Vit D [Calcium Carb W/Vit D 500/200 (*)] 500 mg PO HS 09/27/18 [

Last Taken 09/26/18]


Cetirizine [ZyrTEC 10 mg (*)] 10 mg PO HS 09/27/18 [Last Taken 09/26/18]


Cyanocobalamin [Vitamin B12 1000MCG/ML (*)] 1,000 mcg IM FR 09/27/18 [Last 

Taken 09/21/18]


Ferrous Sulfate [Ferrous Sulf 325 MG (*)] 325 mg PO DAILY 09/27/18 [Last Taken 

09/27/18]


Furosemide [Lasix 40 MG (*)] 40 mg PO BID@09,14 09/27/18 [Last Taken 09/27/18 14

:00]


Gabapentin [Neurontin 300 MG (*)] 300 mg PO BID 09/27/18 [Last Taken 09/27/18 09

:00]


Hydrocodone/Acetaminophen [Norco 5/325 (*)] 1 each PO Q4 PRN 09/27/18 [Last 

Taken 09/27/18]


LORazepam [Ativan (*)] 0.25 mg PO DAILY PRN 09/27/18 [Last Taken Unknown]


LORazepam [Ativan (*)] 0.25 mg PO HS 09/27/18 [Last Taken 09/26/18]


Lidocaine 4%/Menthol 1% [Icy Hot Lidocaine/Menthol 4%/1% Patch (*)] 1 patch TD 

DAILY PRN 09/27/18 [Last Taken Unknown]


Loperamide HCl [Imodium 2 mg (*)] 2 mg PO PRN PRN 09/27/18 [Last Taken 09/24/18]


Magnesium Hydroxide [Milk of Magnesia] 30 ml PO DAILY PRN 09/27/18 [Last Taken 

Unknown]


Metoprolol Tartrate [Lopressor 50 mg (*)] 50 mg PO BID 09/27/18 [Last Taken 09/ 27/18 09:00]


Mirabegron [Myrbetriq] 50 mg PO HS 09/27/18 [Last Taken 09/26/18]


Pantoprazole Sodium [Protonix 40mg (*)] 40 mg PO DAILY 09/27/18 [Last Taken 09/ 27/18]


Potassium Chloride 20 meq PO BID 09/27/18 [Last Taken 09/27/18 09:00]


Sennosides/Docusate Sodium [Senokot-S (OTC)] 1 each PO BID 09/27/18 [Last Taken 

09/26/18]


Sertraline HCl [Zoloft 100mg (*)] 100 mg PO DAILY 09/27/18 [Last Taken 09/27/18]


Simethicone [Mylicon] 80 mg PO BID PRN 09/27/18 [Last Taken Unknown]


Sodium Cl Nasal [Ocean Spray (*)] 1 spray NS DAILY PRN 09/27/18 [Last Taken 

Unknown]


fentaNYL [Duragesic 12 MCG Patch (*)] 12 mcg TD Q72H 09/27/18 [Last Taken 09/26/ 18]


traZODone [traZODONE 50MG (*)] 25 mg PO HS 09/27/18 [Last Taken 09/26/18]








- Anes Hx


Anes Hx: slow to awaken from anesthesia





- Smoking Hx


Smoking Status: Never smoked





- Alcohol Use


Alcohol Use: Sober





- Family Anes Hx


Family Hx Anesthesia Complications: NONE





ANE Labs/Vital Signs





- Labs


Result Diagrams: 


 09/27/18 13:22





 09/28/18 03:32





- Vital Signs


Blood Pressure: 110/84


Heart Rate: 135


Respiratory Rate: 15


O2 Sat (%): 90


Height: 5 ft 5 in


Weight: 70.3 kg





ANE Physical Exam





- Airway


Neck exam: FROM


Mallampati Score: Class 2





- Cardiovascular


Cardiovascular: regular rate and rhythym





- ASA Status


ASA Status: III





ANE Anesthesia Plan


Anesthesia Plan: GA with mask

## 2018-09-28 NOTE — ECHO
[email or web address]





[address] [address] 

Main: [phone] 



Fax: 



Transesophageal Echocardiography 

Name:            SEDA RENE                           MR#:

[id number]

Study Date:      2018                            Study Time:

02:12 PM

YOB: 1934                            Age:

83 year(s)

Height:            ( )                                 Weight:

( )

BSA:                                                   Gender:

Female

Examination:     ARMAAN                                   Indication:

Pre Cardioversion, Pacemaker

Image Quality:                                         Contrast: 

Requested by:     Donaldo Lam  

Heart Rate:                                            Rhythm: 

BP:                / 



Procedure Staff 

Ultrasound Technician:   Pavel Parish RDCS 

Reading Physician:       Dolores Mitchell MD 

Requesting Provider:     Golden Lyons MD 



ARMAAN Exam Details 



Conclusions:          The ejection fraction is visually estimated to

be 30 %.

global hypokinesis.  

Normal RV function.  

There is a pacemaker lead noted in the right ventricle.  

No thrombus in left appendage.  

Moderate to severe mitral regurgitation.  

Plainemitry of the aortic valve in SAX is .9cm2.  

Mild to moderate tricuspid valve regurgitation.  

Small pericardial effusion. 







Measurements: 

Chambers                   Valvular Assessment AV/MV          Valvular

Assessment TV/PV



Normal                                 Normal

Normal

Name         Value   Range             Name        Value Range

Name          Value Range

Visual EF:   30 %                          MV meanP mmHg ( - )  



Additional Measurements: 

Valvular Assessment AV/MV  



Name                   Value  

MV Annulus:           2.4 cm    

MV VTI:               16.70 cm    

MR ERO:               0.320 cm2   

MR PISA radius:       7 mm    



Patient: SEDA RENE                        MRN: [id number]

Study Date: 2018   Page 1 of 2

02:12 PM 









MR Reg. Volume: 39 ml   

MR Reg. Fraction:     52 %    



Findings:             Left Ventricle: 

The ejection fraction is visually estimated to be 30 %. global

hypokinesis.

Right Ventricle: 

Normal RV function. There is a pacemaker lead noted in the right

ventricle.

Left Atrial Appendage: 

Good color flow doppler in the left atrial appendage. No thrombus in

left appendage.

Mitral Valve: 

Moderate to severe mitral regurgitation.  

Aortic Valve: 

The aortic valve is tri-leaflet. Mild aortic cusp calcification is

noted. Plainemitry of the aortic valve in

SAX is .9cm2.  

Tricuspid Valve: 

Mild to moderate tricuspid valve regurgitation.  

Pulmonic Valve: 

The pulmonic valve is normal in appearance.  

Aorta: 

The aorta is normal.  

Pericardium: 

Small pericardial effusion. 



l1n 



Electronically signed by Dolores Mitchell MD on 2018 at 05:00 PM 

(No Signature Object) 



Patient: SEDA RENE                        MRN: [id number]

Study Date: 2018   Page 2 of 2

02:12 PM 







[id number]

## 2018-09-28 NOTE — ASMTCMCOM
CM Note

 

CM Note                       

Notes:

Patient admitted from [facility] where she was found to be in A Fib with RVR. She will have a 

ARMAAN guided cardioversion this afternoon. 



Patient normally lives alone at [facility]. She has a supportive daughter 

named Zara. PT/OT have been ordered. Case Management will follow for d/c planning.

 

Date Signed:  09/28/2018 01:36 PM

Electronically Signed By:Graciela Mock RN

## 2018-09-29 LAB — PLATELET # BLD: 111 10^3/UL (ref 150–400)

## 2018-09-29 RX ADMIN — METOPROLOL TARTRATE SCH MG: 50 TABLET, FILM COATED ORAL at 20:53

## 2018-09-29 RX ADMIN — AMIODARONE HYDROCHLORIDE SCH MG: 200 TABLET ORAL at 20:36

## 2018-09-29 RX ADMIN — PANTOPRAZOLE SODIUM SCH MG: 40 TABLET, DELAYED RELEASE ORAL at 09:19

## 2018-09-29 RX ADMIN — HYDROCODONE BITARTRATE AND ACETAMINOPHEN PRN TAB: 5; 325 TABLET ORAL at 17:32

## 2018-09-29 RX ADMIN — FUROSEMIDE SCH MG: 10 INJECTION, SOLUTION INTRAMUSCULAR; INTRAVENOUS at 16:22

## 2018-09-29 RX ADMIN — APIXABAN SCH MG: 2.5 TABLET, FILM COATED ORAL at 20:36

## 2018-09-29 RX ADMIN — SERTRALINE HYDROCHLORIDE SCH MG: 100 TABLET ORAL at 09:19

## 2018-09-29 RX ADMIN — ACETAMINOPHEN SCH MG: 500 TABLET ORAL at 09:19

## 2018-09-29 RX ADMIN — DOCUSATE SODIUM AND SENNOSIDES SCH TAB: 50; 8.6 TABLET ORAL at 09:19

## 2018-09-29 RX ADMIN — HYDROCODONE BITARTRATE AND ACETAMINOPHEN PRN TAB: 5; 325 TABLET ORAL at 20:39

## 2018-09-29 RX ADMIN — DOCUSATE SODIUM AND SENNOSIDES SCH TAB: 50; 8.6 TABLET ORAL at 20:35

## 2018-09-29 RX ADMIN — ATORVASTATIN CALCIUM SCH MG: 40 TABLET, FILM COATED ORAL at 20:36

## 2018-09-29 RX ADMIN — CETIRIZINE HYDROCHLORIDE SCH MG: 10 TABLET, FILM COATED ORAL at 20:36

## 2018-09-29 RX ADMIN — AMIODARONE HYDROCHLORIDE SCH MG: 200 TABLET ORAL at 09:19

## 2018-09-29 RX ADMIN — HYDROCODONE BITARTRATE AND ACETAMINOPHEN PRN TAB: 5; 325 TABLET ORAL at 07:45

## 2018-09-29 RX ADMIN — IRON SUPPLEMENT SCH MG: 325 TABLET ORAL at 09:19

## 2018-09-29 RX ADMIN — APIXABAN SCH MG: 2.5 TABLET, FILM COATED ORAL at 14:22

## 2018-09-29 RX ADMIN — ASPIRIN SCH MG: 81 TABLET, DELAYED RELEASE ORAL at 09:19

## 2018-09-29 RX ADMIN — METOPROLOL TARTRATE SCH MG: 50 TABLET, FILM COATED ORAL at 09:19

## 2018-09-29 RX ADMIN — ACETAMINOPHEN SCH MG: 500 TABLET ORAL at 20:35

## 2018-09-29 RX ADMIN — Medication SCH MG: at 20:35

## 2018-09-29 RX ADMIN — SODIUM CHLORIDE SCH MLS: 900 INJECTION, SOLUTION INTRAVENOUS at 09:20

## 2018-09-29 NOTE — PDCARPN
Cardiology Progress Note


Assessment/Plan: 


Assessment/Plan:  83-year-old female with paroxysmal atrial fibrillation and 

now atrial flutter.  She has a history of coronary disease with RCA stenting in 

2017 for non ST elevation MI. She has sick sinus syndrome with a Biotronik 

pacemaker, chronic renal insufficiency with worsening creatinine this admission

, chronic opiate use, orthostatic hypotension, history of falls.  She is 

admitted from [facility] with symptomatic and rapid atrial flutter and 

evidence of volume overload.





1. Atrial flutter and ventricular tachycardia:    She is currently on Eliquis.  

ARMAAN guided cardioversion on 09/28 was complicated by sustained VT that occurred 

after cardioversion and seem to be related to PVC.  She did require 3 initial 

shocks for VT.  Her rhythm has stabilized with IV and oral amiodarone.  I 

discussed, with the patient and her family, the long-term toxicities associated 

with amiodarone use and they understand.  She will need to be enrolled in 

outpatient amiodarone surveillance.





    Continue Eliquis for at least 1 month, although I am not sure this is a 

good long-term medication for her given frequent falls.  She could also be 

evaluated at [facility] by Dr. Sierra for consideration of 

Watchman device.  Her CHADS2 Vasc score is 5 which also puts her at high 

thromboembolic risk. 





2. Coronary disease:  Negative troponins here.  I think her symptoms are 

largely related to uncontrolled atrial arrhythmia.  Could consider outpatient 

nuclear stress test.





3. Biotronik pacemaker:  Normal device function.  After her cardioversion we 

decreased her AV delay to promote RV pacing with the intent to reduce the risk 

of PVC mediated VT.  I did discuss the option of upgrading her pacemaker to an 

ICD.  Based on the patient's previous wishes to be DNR, she likely would not 

want upgrade to a defibrillator.  She will discuss this with her family.





4. Chronic renal insufficiency with worsening creatinine here.  This may be 

hypoperfusion related to rapid atrial flutter.  Improved today.  





5. Acute systolic heart failure: LVEF 30% on ARMAAN, likely tachy mediated.   

Continue beta-blockers.  Could add ACE-I depending on renal function.  Lasix 

today.  





6. History of frequent falls:  As per 1., may not be a good long-term Eliquis 

candidate.  Refer to structural heart.





7.  MR: mod to severe on ARMAAN.  May improve with better rhythm control and 

diuresis. 





8.  Anemia: worsening.  No active bleeding here.  Follow.  





9. UTI: on ctx.  








09/29/18 11:01





Subjective: 





She feels better this morning.  No angina.  She is mildly dyspneic.  She is not 

lightheaded.  Feels tired.


Reviewed/Discussed With: family, multidisciplinary team


Time Spent with Patient: greater than 25 minutes


Time Spent with Patient: Greater than 25 minutes spent on this patients care, 

greater than 50% of time spent counseling, educating, and coordinating care 

regarding the above mentioned plan.


Objective: 





 Vital Signs (8 Hrs)











  Temp Pulse Resp BP Pulse Ox


 


 09/29/18 09:19   76   110/66 


 


 09/29/18 08:00  37 C  75  13  110/66  90 L


 


 09/29/18 05:55   79  14  126/80 H  99


 


 09/29/18 04:00  36.3 C  82  18  108/63  93








 Intake/Output (24 Hrs)











 09/28/18 09/29/18 09/30/18





 05:59 05:59 05:59


 


Intake Total  1810 


 


Output Total  1000 


 


Balance  810 


 


Intake:   


 


  Oral (ml)  500 


 


  IV Infused (ml)  1310 


 


    Amiodarone HCl 540 mg In  220 





    D5w 300 ml @ 16.667 mls/   





    hr IV ONCE ONE Rx#:   





    [id number]   


 


    Ns 1,000 ml @ 150 mls/hr  1090 





    IV CONT KELLY Rx#:   





    [id number]   


 


Output:   


 


  Urine (ml)  1000 


 


    Toilet  1000 


 


Other:   


 


  Weight  70.7 kg 








No acute distress.  Sitting up in chair


JVP is 14 cm of water.  Regular rate and rhythm with 2/6 holosystolic murmur at 

the left lower sternal border.


Lungs clear to auscultation without wheeze rhonchi rales


No lower extremity edema


Alert and oriented x3 without gross focal neurologic deficits.  Appropriate 

mood and affect


Result Diagrams: 


 09/29/18 05:15





 09/29/18 05:15


Cardiac Labs: 





 Cardiac Lab Results (72 Hrs)











  09/29/18





  05:15


 


Troponin I  0.034











Telemetry: 





AV sequential pacing.  One 6 beat run of nonsustained VT this morning.





ICD10 Worksheet


Patient Problems: 


 Problems











Problem Status Onset


 


Humerus shaft fracture Acute  


 


CAD (coronary artery disease) Acute  


 


Obstructive sleep apnea Acute  


 


Elevated troponin Acute  


 


Weakness Acute  


 


Acute coronary syndrome Acute  


 


Atrial fibrillation Acute

## 2018-09-29 NOTE — CPEKG
Test Reason : OPEN

Blood Pressure : ***/*** mmHG

Vent. Rate : 085 BPM     Atrial Rate : 084 BPM

   P-R Int : 163 ms          QRS Dur : 160 ms

    QT Int : 468 ms       P-R-T Axes : -19 -39 192 degrees

   QTc Int : 557 ms

 

A-V paced

Confirmed by Dolores Mitchell ([id number]) on 9/29/2018 11:29:25 AM

 

Referred By:             Confirmed By:Dolores Mitchell

## 2018-09-29 NOTE — HOSPPROG
Hospitalist Progress Note


Assessment/Plan: 





# V tac - 4 shocks in CVC 9/28 by Dr. Mitchell, occurred s/p DCCV for aflutter.  S/

P IV Mag.


-cont amiodarone


-cont bb


-keep mag >2, k >4


-cards following





# A fib with RVR.  Per cards, flutter waves were occasionally undetected by PPM

, which may have been firing despite ongoing RVR.  No e/o HF.


-cont amio/metop


-cont eliquis, consider outpt referral to Dr. Sierra for consideration of 

Watchman given her frequent falls on anticoagulation


-cards considering upgrading PPM to ICD, outpt f/u





# UTI. Dysuria + positive UA, UCx with >100K GNR's


-cont ceftriaxone


-follow culture data





# MELA on CKD stage 3. Cr improving with IVF's


-follow





# Mild cognitive impairment





# Falls. Query if she has had symptomatic VT


-PT/OT





# Coronary artery disease - stable, CP free


-cont home meds





Diet.  Cardiac





Prophylaxis.  High risk patient, eliquis





Full code





Disposition - inpt, cont ICU with recent VT.  Discussed with care team.





Subjective: Pt feels well today.  Denies CP, SOB or palpitations.  No fevers/

chills.


Objective: 


 Vital Signs











Temp Pulse Resp BP Pulse Ox


 


 37 C   76   13   110/66   90 L


 


 09/29/18 08:00  09/29/18 09:19  09/29/18 08:00  09/29/18 09:19  09/29/18 08:00








 Laboratory Results





 09/29/18 05:15 





 09/29/18 05:15 





 











 09/28/18 09/29/18 09/30/18





 05:59 05:59 05:59


 


Intake Total  1810 


 


Output Total  1000 


 


Balance  810 








 











PT  16.7 SEC (12.0-15.0)  H  09/27/18  13:22    


 


INR  1.33  (0.83-1.16)  H  09/27/18  13:22    














- Physical Exam


Constitutional: no apparent distress


Eyes: PERRL


Ears, Nose, Mouth, Throat: moist mucous membranes


Cardiovascular: regular rate and rhythym


Respiratory: no respiratory distress, clear to auscultation


Gastrointestinal: normoactive bowel sounds, soft, non-tender abdomen


Skin: warm


Musculoskeletal: full muscle strength


Neurologic: AAOx3


Psychiatric: interacting appropriately





ICD10 Worksheet


Patient Problems: 


 Problems











Problem Status Onset


 


Atrial fibrillation Acute  


 


Acute coronary syndrome Acute  


 


CAD (coronary artery disease) Acute  


 


Elevated troponin Acute  


 


Humerus shaft fracture Acute  


 


Obstructive sleep apnea Acute  


 


Weakness Acute

## 2018-09-30 LAB — PLATELET # BLD: 106 10^3/UL (ref 150–400)

## 2018-09-30 RX ADMIN — CETIRIZINE HYDROCHLORIDE SCH MG: 10 TABLET, FILM COATED ORAL at 20:09

## 2018-09-30 RX ADMIN — ACETAMINOPHEN SCH MG: 500 TABLET ORAL at 20:09

## 2018-09-30 RX ADMIN — ACETAMINOPHEN SCH MG: 500 TABLET ORAL at 09:28

## 2018-09-30 RX ADMIN — METOPROLOL TARTRATE SCH MG: 50 TABLET, FILM COATED ORAL at 20:08

## 2018-09-30 RX ADMIN — ATORVASTATIN CALCIUM SCH MG: 40 TABLET, FILM COATED ORAL at 20:09

## 2018-09-30 RX ADMIN — METOPROLOL TARTRATE SCH MG: 50 TABLET, FILM COATED ORAL at 16:31

## 2018-09-30 RX ADMIN — ASPIRIN SCH MG: 81 TABLET, DELAYED RELEASE ORAL at 09:30

## 2018-09-30 RX ADMIN — AMIODARONE HYDROCHLORIDE SCH MG: 200 TABLET ORAL at 09:28

## 2018-09-30 RX ADMIN — APIXABAN SCH MG: 2.5 TABLET, FILM COATED ORAL at 20:09

## 2018-09-30 RX ADMIN — IRON SUPPLEMENT SCH MG: 325 TABLET ORAL at 09:31

## 2018-09-30 RX ADMIN — HYDROCODONE BITARTRATE AND ACETAMINOPHEN PRN TAB: 5; 325 TABLET ORAL at 16:30

## 2018-09-30 RX ADMIN — HYDROCODONE BITARTRATE AND ACETAMINOPHEN PRN TAB: 5; 325 TABLET ORAL at 12:27

## 2018-09-30 RX ADMIN — METOPROLOL TARTRATE SCH MG: 50 TABLET, FILM COATED ORAL at 09:30

## 2018-09-30 RX ADMIN — PANTOPRAZOLE SODIUM SCH MG: 40 TABLET, DELAYED RELEASE ORAL at 09:31

## 2018-09-30 RX ADMIN — HYDROCODONE BITARTRATE AND ACETAMINOPHEN PRN TAB: 5; 325 TABLET ORAL at 20:08

## 2018-09-30 RX ADMIN — DOCUSATE SODIUM AND SENNOSIDES SCH TAB: 50; 8.6 TABLET ORAL at 20:09

## 2018-09-30 RX ADMIN — APIXABAN SCH MG: 2.5 TABLET, FILM COATED ORAL at 09:30

## 2018-09-30 RX ADMIN — AMIODARONE HYDROCHLORIDE SCH MG: 200 TABLET ORAL at 20:09

## 2018-09-30 RX ADMIN — SERTRALINE HYDROCHLORIDE SCH MG: 100 TABLET ORAL at 09:31

## 2018-09-30 RX ADMIN — FUROSEMIDE SCH MG: 10 INJECTION, SOLUTION INTRAMUSCULAR; INTRAVENOUS at 09:32

## 2018-09-30 RX ADMIN — DOCUSATE SODIUM AND SENNOSIDES SCH TAB: 50; 8.6 TABLET ORAL at 09:31

## 2018-09-30 RX ADMIN — FUROSEMIDE SCH MG: 10 INJECTION, SOLUTION INTRAMUSCULAR; INTRAVENOUS at 16:31

## 2018-09-30 RX ADMIN — Medication SCH MG: at 20:09

## 2018-09-30 NOTE — HOSPPROG
Hospitalist Progress Note


Assessment/Plan: 





# V tac - 3 shocks in CVC 9/28 by Dr. Mitchell, occurred s/p DCCV for aflutter.  S/

P IV Mag.


-cont amiodarone


-cont bb


-keep mag >2, k >4


-cards following





# A fib/flutter with RVR.  Per cards, flutter waves were occasionally 

undetected by PPM, which may have been firing despite ongoing RVR.  No e/o HF.


-cont amio/metop


-cont eliquis, consider outpt referral to Dr. Sierra for consideration of 

Watchman given her frequent falls on anticoagulation


-cards considering upgrading PPM to ICD, outpt f/u





# Wide complex tachycardia - occurred overnight, unclear if this is atrial 

flutter versus recurrent v tac


-Metoprolol increased per cards


-pacemaker interrogation today





# Acute systolic HF - previous EF 30%, BNP 16K


-IV Lasix


-follow I&O's, daily wts





# UTI. Received 3 days IV Ceftriaxone





# MELA on CKD stage 3. Cr 1.9 --> 1.4, suspect pre-renal possibly related to 

poor forward flow with tachyarrhythmias


-follow





# Mild cognitive impairment





# Falls. Query if related to symptomatic VT


-PT/OT





# Coronary artery disease - stable, CP free


-cont home meds





Diet.  Cardiac





Prophylaxis.  High risk patient, eliquis





Full code





Disposition - inpt, cont ICU with recurrent tachyarrhythmias.  Discussed with 

care team.





Subjective: PT feels well.  Denies CP or SOB at this time.  No palpitations.  

She did not feel symptoms with tachyarrhythmia overnight.


Objective: 


 Vital Signs











Temp Pulse Resp BP Pulse Ox


 


 37 C   92   18   115/77   98 


 


 09/30/18 08:00  09/30/18 10:11  09/30/18 10:11  09/30/18 10:11  09/30/18 10:11








 Laboratory Results





 09/30/18 03:35 





 09/30/18 03:35 





 











 09/29/18 09/30/18 10/01/18





 05:59 05:59 05:59


 


Intake Total 1810 2394 


 


Output Total 1000 1400 


 


Balance 810 994 








 











PT  16.7 SEC (12.0-15.0)  H  09/27/18  13:22    


 


INR  1.33  (0.83-1.16)  H  09/27/18  13:22    














- Physical Exam


Constitutional: no apparent distress


Eyes: PERRL


Ears, Nose, Mouth, Throat: moist mucous membranes


Cardiovascular: regular rate and rhythym


Respiratory: no respiratory distress, clear to auscultation, reduced air 

movement


Gastrointestinal: normoactive bowel sounds, soft, non-tender abdomen


Skin: warm


Neurologic: AAOx3


Psychiatric: interacting appropriately





ICD10 Worksheet


Patient Problems: 


 Problems











Problem Status Onset


 


Atrial fibrillation Acute  


 


Acute coronary syndrome Acute  


 


CAD (coronary artery disease) Acute  


 


Elevated troponin Acute  


 


Humerus shaft fracture Acute  


 


Obstructive sleep apnea Acute  


 


Weakness Acute

## 2018-09-30 NOTE — PDCARPN
Cardiology Progress Note


Assessment/Plan: 


Assessment/Plan:  83-year-old female with paroxysmal atrial fibrillation and 

atrial flutter.  She has a history of coronary disease with RCA stenting in 

2017 for non ST elevation MI. She has sick sinus syndrome with a Biotronik 

pacemaker, chronic renal insufficiency with worsening creatinine this admission

, chronic opiate use, orthostatic hypotension, history of falls.  She is 

admitted from [facility] 9/27 with symptomatic and rapid atrial flutter and 

evidence of volume overload.





1. Atrial flutter and ventricular tachycardia:    She is currently on Eliquis.  

ARMAAN guided cardioversion on 09/28 was complicated by sustained VT that occurred 

after cardioversion and seem to be related to PVC.  She did require 3 

additional shocks for VT.  Loaded with IV and oral amiodarone.  Today she is 

having salvos of wide complex tachycardia.  Cannot tell from telemetry whether 

this is AFL or VT.  She does not have symptoms with this. Device interrogation 

today.  Increase metoprolol to TID.





I discussed, with the patient and her family, the long-term toxicities 

associated with amiodarone use and they understand.  She will need to be 

enrolled in outpatient amiodarone surveillance.  I did discuss the option of 

upgrading her pacemaker to an ICD.  Based on the patient's previous wishes to 

be DNR, she likely would not want upgrade to a defibrillator.  She will discuss 

this with her family.





Continue Eliquis for at least 1 month, although I am not sure this is a good 

long-term medication for her given frequent falls.  She could also be evaluated 

at [facility] by Dr. Sierra for consideration of Watchman 

device.  Her CHADS2 Vasc score is 5 which also puts her at high thromboembolic 

risk. 





2. Coronary disease:  Negative troponins here.  I think her symptoms are 

largely related to uncontrolled atrial arrhythmia.  Could consider outpatient 

nuclear stress test.





3. Biotronik pacemaker:  Normal device function.  After her cardioversion we 

decreased her AV delay to promote RV pacing with the intent to reduce the risk 

of PVC mediated VT.  





4. Chronic renal insufficiency with worsening creatinine here.  This may be 

hypoperfusion related to rapid atrial flutter.  Improved today.  





5. Acute systolic heart failure: LVEF 30% on ARMAAN, likely tachy mediated.   

Continue beta-blockers.  Could add ACE-I depending on renal function.  Continue 

lasix.   





6. History of frequent falls:  As per 1., may not be a good long-term Eliquis 

candidate.  Refer to structural heart.





7.  MR: mod to severe on ARMAAN.  May improve with better rhythm control and 

diuresis. 





8.  Anemia: worsening.  No active bleeding here.  D/C ASA and continue Eliquis.

  Follow.  





9. UTI: E. Coli.  On ctx.  











09/30/18 10:17





Subjective: 





She reports feeling well.  No lightheadedness.  She has pressure in her upper 

abdomen that she attributes to gas.  No dyspnea.


Reviewed/Discussed With: multidisciplinary team


Objective: 





 Vital Signs (8 Hrs)











  Temp Pulse Resp BP Pulse Ox


 


 09/30/18 09:30   95   106/70 


 


 09/30/18 08:00  37 C  102 H  14  106/72  95


 


 09/30/18 04:00  36.9 C  102 H  25 H  114/73  95








 Intake/Output (24 Hrs)











 09/29/18 09/30/18 10/01/18





 05:59 05:59 05:59


 


Intake Total 1810 2394 


 


Output Total 1000 1400 


 


Balance 810 994 


 


Intake:   


 


  Oral (ml) 500 700 


 


  IV Intake (ml)  1414 


 


  IV Infused (ml) 1310 280 


 


    Amiodarone HCl 540 mg In 220 280 





    D5w 300 ml @ 16.667 mls/   





    hr IV ONCE ONE Rx#:   





    [id number]   


 


    Ns 1,000 ml @ 150 mls/hr 1090 0 





    IV CONT KELLY Rx#:   





    [id number]   


 


Output:   


 


  Urine (ml) 1000 1400 


 


    Toilet 1000 1400 


 


Other:   


 


  Weight 70.7 kg 69.9 kg 


 


  Number of Voids   


 


    Toilet  1 








No acute distress.


JVP is 12. Regular rate and rhythm without gallop.  Soft early systolic murmur 

at the apex.


Lungs clear bilaterally wheeze rhonchi or rales


No lower extremity edema


Alert and oriented x3 without focal neurologic deficits.  Appropriate mood and 

affect.  


Result Diagrams: 


 09/30/18 03:35





 09/30/18 03:35


Cardiac Labs: 





 Cardiac Lab Results (72 Hrs)











  09/29/18





  05:15


 


Troponin I  0.034











Telemetry: 





V paced rhythm.  Salvos of wide complex rhythm atrial flutter versus VT.  Pulse 

ox only diminishes slightly with the salvos.





ICD10 Worksheet


Patient Problems: 


 Problems











Problem Status Onset


 


Humerus shaft fracture Acute  


 


CAD (coronary artery disease) Acute  


 


Obstructive sleep apnea Acute  


 


Elevated troponin Acute  


 


Weakness Acute  


 


Acute coronary syndrome Acute  


 


Atrial fibrillation Acute

## 2018-10-01 LAB — PLATELET # BLD: 123 10^3/UL (ref 150–400)

## 2018-10-01 RX ADMIN — AMIODARONE HYDROCHLORIDE SCH MG: 200 TABLET ORAL at 08:33

## 2018-10-01 RX ADMIN — METOPROLOL TARTRATE SCH MG: 50 TABLET, FILM COATED ORAL at 16:28

## 2018-10-01 RX ADMIN — HYDROCODONE BITARTRATE AND ACETAMINOPHEN PRN TAB: 5; 325 TABLET ORAL at 18:19

## 2018-10-01 RX ADMIN — APIXABAN SCH MG: 2.5 TABLET, FILM COATED ORAL at 19:45

## 2018-10-01 RX ADMIN — CETIRIZINE HYDROCHLORIDE SCH MG: 10 TABLET, FILM COATED ORAL at 19:45

## 2018-10-01 RX ADMIN — IRON SUPPLEMENT SCH MG: 325 TABLET ORAL at 08:33

## 2018-10-01 RX ADMIN — ACETAMINOPHEN SCH MG: 500 TABLET ORAL at 08:33

## 2018-10-01 RX ADMIN — DOCUSATE SODIUM AND SENNOSIDES SCH TAB: 50; 8.6 TABLET ORAL at 08:32

## 2018-10-01 RX ADMIN — METOPROLOL TARTRATE SCH MG: 50 TABLET, FILM COATED ORAL at 21:01

## 2018-10-01 RX ADMIN — HYDROCODONE BITARTRATE AND ACETAMINOPHEN PRN TAB: 5; 325 TABLET ORAL at 11:21

## 2018-10-01 RX ADMIN — ATORVASTATIN CALCIUM SCH MG: 40 TABLET, FILM COATED ORAL at 19:45

## 2018-10-01 RX ADMIN — DOCUSATE SODIUM AND SENNOSIDES SCH TAB: 50; 8.6 TABLET ORAL at 19:46

## 2018-10-01 RX ADMIN — FUROSEMIDE SCH MG: 10 INJECTION, SOLUTION INTRAMUSCULAR; INTRAVENOUS at 09:36

## 2018-10-01 RX ADMIN — APIXABAN SCH MG: 2.5 TABLET, FILM COATED ORAL at 08:32

## 2018-10-01 RX ADMIN — PANTOPRAZOLE SODIUM SCH MG: 40 TABLET, DELAYED RELEASE ORAL at 08:33

## 2018-10-01 RX ADMIN — AMIODARONE HYDROCHLORIDE SCH MG: 200 TABLET ORAL at 19:45

## 2018-10-01 RX ADMIN — Medication SCH MG: at 19:46

## 2018-10-01 RX ADMIN — SERTRALINE HYDROCHLORIDE SCH MG: 100 TABLET ORAL at 08:32

## 2018-10-01 RX ADMIN — METOPROLOL TARTRATE SCH MG: 50 TABLET, FILM COATED ORAL at 08:32

## 2018-10-01 RX ADMIN — ACETAMINOPHEN SCH MG: 500 TABLET ORAL at 19:46

## 2018-10-01 RX ADMIN — DIGOXIN SCH MCG: 125 TABLET ORAL at 11:10

## 2018-10-01 NOTE — ASMTCMCOM
CM Note

 

CM Note                       

Notes:

Patient had cardioversions and pacemaker reprogramming, has systolic hrt failure and mod-severe 

MR, UTI. PT working with her and feels as though she can return to her Assisted Living at [facility] [phone]. CM to follow.

 

Date Signed:  10/01/2018 04:41 PM

Electronically Signed By:Liberty Killian LCSW

## 2018-10-01 NOTE — HOSPPROG
Hospitalist Progress Note


Assessment/Plan: 





DIAGNOSES: 


* status post several episodes of sustained ventricular tachycardia requiring 

cardioversion, all occurring after cardioversion attempt for AFib


* No recurrent V-tach today or overnight


* Beta-blocker increased


* History of coronary artery disease, though No current signs of ischemia, EF 

at 30% by echo


* acute systolic congestive heart failure


* Improving with diuresis here so far and with rate control


* atrial fibrillation, rapid ventricular rate


* Rate control improved today with addition of digoxin and increase of 

metoprolol dose


* acute renal failure at presentation due to AFib and heart failure


* Improved with rate control and diuresis here so far


* suspected urinary tract infection with sensitive E coli on culture


* Asymptomatic at this time with treatment with Rocephin


* falls at home, question if due to arrhythmia and heart failure


* Did better with therapies today





PLANS:


* Continue cardiac monitoring


* Continue diuresis


* Continue on current doses of metoprolol and digoxin


* Follow renal function closely


* There has been discussion with Cardiology regarding changing her pacemaker to 

an and plan will defibrillator which would require at least a lead change.  She 

and her son have questions around this.  I will talk further with Cardiology 

Consultants but at this point with her ejection fraction at 30% and multiple 

episodes of sustained V tach it may be reasonable to consider doing this for 

her.





I reviewed all the above in detail with the patient and with her son at the 

bedside.  They had many questions and I answered all these questions fully for 

them as able.


Seen by me today on hospitalist rounds and multidisciplinary rounds


Reviewed in detail today by me with Dr. Bran Chawla





SUBJECTIVE:


Feels notably better today


Did better with therapies


No shortness of breath, no chest pain, no palpitations





OBJECTIVE


Vitals reviewed:  All stable without fever


Cardiac Monitor, my review:  AFib now rate controlled in the 80s to 90s, no V-

tach noted overnight or today





Exam:


alert oriented 


skin warm dry color ok


resps not labored


lungs clear BSs


heart irregular


abd soft nondistended nontender, bowel sounds present


limbs warm, no edema





iv site ok





Lab data:


Electrolytes stable, creatinine down to 1.3


Platelets improved, hemoglobin improved at 10





Microbiology data:


Urine culture growing a sensitive E coli at this point











Objective: 


 Vital Signs











Temp Pulse Resp BP Pulse Ox


 


 37.2 C   98   19   110/78   98 


 


 10/01/18 08:00  10/01/18 08:00  10/01/18 08:00  10/01/18 08:00  10/01/18 08:00








 Laboratory Results





 10/01/18 05:05 





 10/01/18 05:05 





 











 09/30/18 10/01/18 10/02/18





 06:59 06:59 06:59


 


Intake Total 2394 400 


 


Output Total 1400 1000 200


 


Balance 994 -600 -200








 











PT  16.7 SEC (12.0-15.0)  H  09/27/18  13:22    


 


INR  1.33  (0.83-1.16)  H  09/27/18  13:22    














- Time Spent With Patient


Time Spent with Patient: greater than 35 minutes


Time Spent with Patient: Greater than 35 minutes spent on this patients care, 

greater than 50% of time spent counseling, educating, and coordinating care 

regarding the above mentioned plan.





ICD10 Worksheet


Patient Problems: 


 Problems











Problem Status Onset


 


Atrial fibrillation Acute  


 


Acute coronary syndrome Acute  


 


CAD (coronary artery disease) Acute  


 


Elevated troponin Acute  


 


Humerus shaft fracture Acute  


 


Obstructive sleep apnea Acute  


 


Weakness Acute

## 2018-10-01 NOTE — SOAPPROG
SOAP Progress Note


Assessment/Plan: 


Assessment:





1. Atrial flutter/AFIB.  Status post cardioversion with return to atrial 

fibrillation.





2. Sustained Ventricular tachycardia.  Noted immediately following previous 

cardioversion following an R on T phenomena.  This required cardioversion.





3. Coronary disease:  With previous PCI/stenting Negative troponins here.  





4. Biotronik dual-chamber pacemaker:  Normal device function.  She has required 

device reprogramming to allow appropriate flutter/fib wave sensing and mode 

switching.





5. Chronic renal insufficiency.  Improving indices of renal function following 

diuresis and rate control.  





6. Acute systolic heart failure: LVEF 30% on ARMAAN, likely tachy mediated. 





7. History of frequent falls.





8. Moderate to severe mitral regurgitation.  Likely functional in nature.





9.  Anemia.





10. UTI: E. Coli.  On ctx.  








Plan:


1. At the present time she appears to be well compensated.  I did stop her 

intravenous Lasix and switched her over to p.o. Lasix at 20 mg twice daily.


2. I have added digoxin 0.125 mg daily for additional rate control.


3. Her pacemaker was interrogated and the sensitivity in the atrial lead was 

adjusted to allow appropriate fib wave sensing and mode switching.


4. We will continue systemic anticoagulation.


5. She has expressed her wishes to be a limited resuscitation.  She states that 

she would accept defibrillation without chest compressions and intubation.


6. We may consider repeat cardioversion here in the near future.  


10/01/18 11:17





Subjective: 





The patient was seen and examined.  Her chart was reviewed.  The case was also 

discussed with Dr. Dolores Mitchell.  Today she appears to be doing very well.  She 

states she feels much better than at the time of her hospital admission.  Her 

respiratory status and lower extremity edema have improved.  It should be noted 

that she is on chronic oxygen therapy as an outpatient with 2 L. She is in 

atrial fibrillation.  Her heart rates are reasonably controlled with a resting 

heart rate of approximately 100 beats per minute.


Objective: 





 Vital Signs











Temp Pulse Resp BP Pulse Ox


 


 37.2 C   95   19   110/78   98 


 


 10/01/18 08:00  10/01/18 11:10  10/01/18 08:00  10/01/18 08:00  10/01/18 08:00








 Laboratory Results





 10/01/18 05:05 





 10/01/18 05:05 





 











 09/30/18 10/01/18 10/02/18





 05:59 05:59 05:59


 


Intake Total 2394 400 


 


Output Total 1400 900 300


 


Balance 994 -500 -300








 











PT  16.7 SEC (12.0-15.0)  H  09/27/18  13:22    


 


INR  1.33  (0.83-1.16)  H  09/27/18  13:22    














Physical Exam





- Physical Exam


General Appearance: WD/WN, no apparent distress, other (Wearing nasal cannula 

oxygen)


Neck: non-tender, full range of motion, supple


Respiratory: chest non-tender, lungs clear, normal breath sounds


Cardiac/Chest: irregularly irregular, No edema, No gallop, No JVD


Peripheral Pulses: 2+: carotid (R), carotid (L)


Abdomen: normal bowel sounds, non-tender


Pelvic Exam: deferred


Rectal: deferred


Neuro/Psych: alert, oriented x 3





ICD10 Worksheet


Patient Problems: 


 Problems











Problem Status Onset


 


Atrial fibrillation Acute  


 


Acute coronary syndrome Acute  


 


CAD (coronary artery disease) Acute  


 


Elevated troponin Acute  


 


Humerus shaft fracture Acute  


 


Obstructive sleep apnea Acute  


 


Weakness Acute

## 2018-10-02 VITALS — SYSTOLIC BLOOD PRESSURE: 128 MMHG | DIASTOLIC BLOOD PRESSURE: 85 MMHG

## 2018-10-02 RX ADMIN — METOPROLOL TARTRATE SCH MG: 50 TABLET, FILM COATED ORAL at 08:57

## 2018-10-02 RX ADMIN — METOPROLOL TARTRATE SCH MG: 50 TABLET, FILM COATED ORAL at 18:32

## 2018-10-02 RX ADMIN — ACETAMINOPHEN SCH MG: 500 TABLET ORAL at 08:58

## 2018-10-02 RX ADMIN — APIXABAN SCH MG: 5 TABLET, FILM COATED ORAL at 08:59

## 2018-10-02 RX ADMIN — AMIODARONE HYDROCHLORIDE SCH MG: 200 TABLET ORAL at 08:58

## 2018-10-02 RX ADMIN — FUROSEMIDE SCH MG: 20 TABLET ORAL at 08:59

## 2018-10-02 RX ADMIN — PANTOPRAZOLE SODIUM SCH MG: 40 TABLET, DELAYED RELEASE ORAL at 08:58

## 2018-10-02 RX ADMIN — IRON SUPPLEMENT SCH MG: 325 TABLET ORAL at 08:59

## 2018-10-02 RX ADMIN — APIXABAN SCH MG: 5 TABLET, FILM COATED ORAL at 18:32

## 2018-10-02 RX ADMIN — METOPROLOL TARTRATE SCH MG: 50 TABLET, FILM COATED ORAL at 16:27

## 2018-10-02 RX ADMIN — DOCUSATE SODIUM AND SENNOSIDES SCH: 50; 8.6 TABLET ORAL at 07:27

## 2018-10-02 RX ADMIN — ATORVASTATIN CALCIUM SCH MG: 40 TABLET, FILM COATED ORAL at 18:32

## 2018-10-02 RX ADMIN — AMIODARONE HYDROCHLORIDE SCH MG: 200 TABLET ORAL at 18:31

## 2018-10-02 RX ADMIN — DIGOXIN SCH MCG: 125 TABLET ORAL at 08:58

## 2018-10-02 RX ADMIN — FUROSEMIDE SCH MG: 20 TABLET ORAL at 16:27

## 2018-10-02 RX ADMIN — SERTRALINE HYDROCHLORIDE SCH MG: 100 TABLET ORAL at 08:58

## 2018-10-02 NOTE — SOAPPROG
SOAP Progress Note


Assessment/Plan: 


Assessment:





1. Atrial flutter/AFIB.  Status post cardioversion with return to atrial 

fibrillation.





2. Sustained Ventricular tachycardia.  Noted immediately following previous 

cardioversion following an R on T phenomena.  This required cardioversion.





3. Coronary disease:  With previous PCI/stenting Negative troponins here.  





4. Biotronik dual-chamber pacemaker:  Normal device function.  She has required 

device reprogramming to allow appropriate flutter/fib wave sensing and mode 

switching.





5. Chronic renal insufficiency.  Improving indices of renal function following 

diuresis and rate control.  





6. Acute systolic heart failure: LVEF 30% on ARMAAN, likely tachy mediated. 





7. History of frequent falls.





8. Moderate to severe mitral regurgitation.  Likely functional in nature.





9.  Anemia.





10. UTI: E. Coli.  On ctx.  





Plan:


1. At this point, she appears stable.  I think that we can consider hospital 

discharge.


2. I would like to continue her on the current dose of amiodarone for the next 

week to 10 days.  At that time, we can consider a reduction in the dose down to 

200 mg daily.


3. Within the next several weeks consideration can be given to bring her back 

to the hospital for another attempted cardioversion.


4. Depending on her renal function we may consider instituting an ACE-inhibitor.


5. Previously, the patient had expressed wishes to be do not resuscitate.  As 

result, it is thought that she is not a good candidate for an upgrade of her 

current device to an ICD as this would be contrary to her previous wishes.  I 

am happy to discuss this further with both she and her family if it is thought 

that this is being reconsidered.





10/02/18 09:12





Subjective: 


She states that she is feeling well today.  She slept well and currently is not 

experiencing any symptoms of dyspnea.  Heart rates have improved overnight.


Objective: 





 Vital Signs











Temp Pulse Resp BP Pulse Ox


 


 36.6 C   98   20   113/76   97 


 


 10/02/18 07:14  10/02/18 07:14  10/02/18 07:14  10/02/18 07:14  10/02/18 07:14








 Laboratory Results





 10/01/18 05:05 





 10/02/18 03:33 





 











 10/01/18 10/02/18 10/03/18





 05:59 05:59 05:59


 


Intake Total 400 1600 


 


Output Total 900 2425 120


 


Balance -500 -825 -120








 











PT  16.7 SEC (12.0-15.0)  H  09/27/18  13:22    


 


INR  1.33  (0.83-1.16)  H  09/27/18  13:22    














Physical Exam





- Physical Exam


General Appearance: WD/WN, no apparent distress


Neck: non-tender, full range of motion


Respiratory: chest non-tender, lungs clear


Cardiac/Chest: normal peripheral pulses, irregularly irregular, No edema, No 

gallop, No JVD


Peripheral Pulses: 2+: carotid (R), carotid (L)





ICD10 Worksheet


Patient Problems: 


 Problems











Problem Status Onset


 


Atrial fibrillation Acute  


 


Acute coronary syndrome Acute  


 


CAD (coronary artery disease) Acute  


 


Elevated troponin Acute  


 


Humerus shaft fracture Acute  


 


Obstructive sleep apnea Acute  


 


Weakness Acute

## 2018-10-02 NOTE — ASMTDCNOTE
Case Management Discharge

 

Discharge Order Complete?     Answers:  Yes                                   

Patient to Obtain             Answers:  Other                         Notes:  [facility]

Medications                                                                   

Faxed Final Orders            Answers:  Yes                           [facility]  [facility]

Agency/Facility Transfer      Answers:  Yes                           Notes:  to [facility]

Report Printed & Faxed to                                                     

Receiving Agency                                                              

Discharge Comments            

Notes:

10/2/2018 Case Management Note



Pt returning to [facility] resuming RN and PT through [facility] 

Services.  Faxed final orders to [facility].  FRANCESCO to call report.

 

Date Signed:  10/02/2018 04:01 PM

Electronically Signed By:Ragini Zelaya RN

## 2018-10-02 NOTE — ASMTCMCOM
CM Note

 

CM Note                       

Notes:

10/2/2018 Case Management Note



Faxed updates to [facility].  Discussed with Mendez on the phone.



Case Management d/c poc: anticipating return to [facility] with resumption of 

[facility] RN and PT



Case Management to follow.

 

Date Signed:  10/02/2018 12:46 PM

Electronically Signed By:Ragini Zelaya RN

## 2018-10-02 NOTE — PDDCSUM
Discharge Summary


Discharge Summary: 





DISCHARGE DIAGNOSES:


* acute systolic congestive heart failure


* atrial fibrillation with rapid ventricular rate, persistent


* ventricular tachycardia due to R on T phenomenon occurring after attempted 

cardioversion of atrial fibrillation; the ventricular tachycardia require 

cardioversion x3


* acute renal failure, resolved


* suspected urinary tract infection with sensitive E coli treated in the 

hospital





CONSULTANTS:


Doctors Dolores Mitchell and Goyo Hogue





PROCEDURES:


Transesophageal echocardiogram followed by failed attempt at cardioversion of 

atrial fibrillation; ejection fraction 30% on the echocardiogram with mitral 

regurgitation present


Electrical cardioversion of ventricular tachycardia


Multiple pacemaker interrogations and pacemaker setting adjustment





HOSPITAL COURSE SUMMARY:


This patient with known systolic heart disease and coronary disease, came to 

the hospital with acute congestive heart failure and rapid atrial fibrillation 

without evidence of ischemia.  She was on treatment at home with Eliquis, Lasix

, beta-blocker, aspirin.  She did have some acute renal insufficiency with 

creatinine 1.8 at the time of admission.  The patient was started on diuretics.

  She was taken to the procedure room where she had a ARMAAN showing no clot, LV 

EF 30%, some significant mitral regurgitation.  She underwent an attempt at 

electrical cardioversion which did not convert the atrial fibrillation but did 

lead to some episodes of R on T induced ventricular tachycardia x3, each with a 

pulse and each treated successfully with electrical cardioversion.  At that 

time she was transferred to the intensive care unit and followed closely.  Her 

beta-blocker was increased to 50 three times daily metoprolol and she continued 

successful diuresis.  At this time she is feeling much better with resolution 

of her acute heart failure and volume overload, resolution of her acute renal 

failure, and she has had no more episodes of ventricular arrhythmia.  She has 

had multiple interrogations of her pacemaker that show atrial fibrillation with 

ventricular pacing from that.  There has been some adjustment to her pacemaker 

to allow better AFib a flutter sensing and mode switching.





The patient is on her usual home oxygen level.  She is walking in the hallways 

here without difficulty.  There has been no angina, she is eating well, there 

is no fever.  Incidentally she did have what appears to be in acute E coli 

cystitis and she was treated for that with antibiotics successfully here.  She 

is stable for discharge to home at this time.





Due to her decreased ejection fraction and her multiple episodes of ventricular 

tachycardia, there was discussion around the possibility of placing an 

implantable defibrillator.  On discussion with the patient she has in the past 

had usually request for do not resuscitate orders and she thinks that she would 

still want to have that request going forward from this moment.  Therefore it 

was recommended that she not have an implantable defibrillator placed.  She 

currently is in agreement with that, but she does understand that if for some 

reason she changes her mind and would want to be treated for malignant 

arrhythmias or resuscitated from pulseless arrhythmia, then she can undergo a 

defibrillator placement.  There is consideration for having her come back in 

the future for further attempts at cardioversion and/or ablation.





PENDING TEST RESULTS:


None





MEDICATION CHANGES:


Increase of most Toprol to 50 three times daily


Addition of digoxin 125 daily


Addition of amiodarone 400 twice daily with plans to reduce that daily in 

approximately 10 days


Reduction of her Lasix dose to 20 mg twice daily





FOLLOW-UP PLAN:


At [facility] in 7-10 days





DISCHARGE INSTRUCTIONS PATIENT:


2 g sodium low-fat diet


Daily weights and report significant increases in weight


Report any potential medicine side effects to Cardiology Clinic, list of common/

concerning side effects given the patient


Follow up without fail in Cardiology Clinic in 7-10 days





Greater than 35 minutes bedside and care coordination time today

## 2018-10-02 NOTE — PDIAF
- Diagnosis


Diagnosis: Congestive heart failure, AFib, V-tach, renal failure


Code Status: Full Code





- Medication Management


Discharge Medications: 


 Medications to Continue on Transfer





Mometasone Furoate 2 spray NS DAILY PRN 03/19/17 [Last Taken Unknown]


Acetaminophen [Tylenol  mg (*)] 500 mg PO BID 09/27/18 [Last Taken 09/27/ 18 09:00]


Apixaban [Eliquis] 2.5 mg PO BID 09/27/18 [Last Taken 09/27/18 09:00]


Atorvastatin Calcium [Lipitor 40 mg (*)] 40 mg PO HS 09/27/18 [Last Taken 09/26/ 18]


Calcium Carb W/Vit D [Calcium Carb W/Vit D 500/200 (*)] 500 mg PO HS 09/27/18 [

Last Taken 09/26/18]


Cetirizine [ZyrTEC 10 mg (*)] 10 mg PO HS 09/27/18 [Last Taken 09/26/18]


Cyanocobalamin [Vitamin B12 1000MCG/ML (*)] 1,000 mcg IM FR 09/27/18 [Last 

Taken 09/21/18]


Ferrous Sulfate [Ferrous Sulf 325 MG (*)] 325 mg PO DAILY 09/27/18 [Last Taken 

09/27/18]


Hydrocodone/Acetaminophen [Norco 5/325 (*)] 1 each PO Q4 PRN 09/27/18 [Last 

Taken 09/27/18]


LORazepam [Ativan (*)] 0.25 mg PO DAILY PRN 09/27/18 [Last Taken Unknown]


LORazepam [Ativan (*)] 0.25 mg PO HS 09/27/18 [Last Taken 09/26/18]


Lidocaine 4%/Menthol 1% [Icy Hot Lidocaine/Menthol 4%/1% Patch (*)] 1 patch TD 

DAILY PRN 09/27/18 [Last Taken Unknown]


Loperamide HCl [Imodium 2 mg (*)] 2 mg PO PRN PRN 09/27/18 [Last Taken 09/24/18]


Mirabegron [Myrbetriq] 50 mg PO HS 09/27/18 [Last Taken 09/26/18]


Pantoprazole Sodium [Protonix 40mg (*)] 40 mg PO DAILY 09/27/18 [Last Taken 09/ 27/18]


Potassium Chloride 20 meq PO BID 09/27/18 [Last Taken 09/27/18 09:00]


Sennosides/Docusate Sodium [Senokot-S] 1 each PO BID 09/27/18 [Last Taken 09/26/ 18]


Sertraline HCl [Zoloft 100mg (*)] 100 mg PO DAILY 09/27/18 [Last Taken 09/27/18]


Simethicone [Mylicon] 80 mg PO BID PRN 09/27/18 [Last Taken Unknown]


Sodium Cl Nasal [Ocean Spray (*)] 1 spray NS DAILY PRN 09/27/18 [Last Taken 

Unknown]


fentaNYL [Duragesic 12 MCG Patch (*)] 12 mcg TD Q72H 09/27/18 [Last Taken 09/26/ 18]


traZODone [traZODONE 50MG (*)] 25 mg PO HS 09/27/18 [Last Taken 09/26/18]


Amiodarone HCl [Pacerone (*)] 400 mg PO BID #60 tab 10/02/18 [Last Taken Unknown

]


Bumetanide [Bumex (*)] 1 mg PO DAILY #0 10/02/18 [Last Taken 09/27/18]


Digoxin [Lanoxin 125 mcg (RX)] 125 mcg PO DAILY10 #30 tab 10/02/18 [Last Taken 

Unknown]


Furosemide [Lasix 20 MG (*)] 20 mg PO BIDDIUR #60 tab 10/02/18 [Last Taken 

Unknown]


Metoprolol Tartrate [Lopressor 50 mg (*)] 50 mg PO TID #90 tab 10/02/18 [Last 

Taken Unknown]








Additional Medication Instructions: -2 g / day sodium diet is essential.  -

Patient should weigh herself daily and keep track of weights, contact [facility] for persistent weight gain of > 3 lb.  -the patient should be seen at 

[facility] in 7-10 days


Discharge Medications: Refer to the Discharge Home Medication list for PRN 

reason.





- Orders


Isolation Type: None


Diet Recommendation: cardiac -low fat low salt


Diet Texture: Regular Texture Diet


Additional Instructions: 


Make an appointment to be seen at [facility] in 7-10 days


Keep on a low-salt diet


Weigh yourself daily and record her weights, 1st thing in the morning before 

breakfast and before dressing


If you notice your weight going up more than 3 lb and staying up there, contact 

the Cardiology Clinic





Contact Cardiology Clinic if you have shortness of breath, fevers, nausea, 

weakness or lightheadedness interfering with walking, or any other symptoms 

that you are concerned about





- Follow Up Care


Current Providers and Referrals: 


Anita Wang MD [Primary Care Provider] - As per Instructions

## 2018-10-02 NOTE — ASDISCHSUM
----------------------------------------------

Discharge Information

----------------------------------------------

Plan Status:Home with Home Health                    Medically Cleared to Leave:10/02/2018

Discharge Date:10/02/2018                            CM D/C Disposition:Home Health Service

ADT D/C Disposition:Home, Routine, Self-Care         Projected Discharge Date:10/03/2018 11:00 AM

Transportation at D/C:                               Discharge Delay Reason:

Follow-Up Date:10/03/2018 11:00 AM                   Discharge Slot:

Final Diagnosis:Afib with RVR

----------------------------------------------

Placement Information

----------------------------------------------

Referral Type:Assisted Living Residence              Referral ID:[id number]

Provider Name:[facility]

Address 1:[address]                               Phone Number:

Address 2:                                           Fax Number:

City:[place]                                         Selection Factors:

State:[place]

 

----------------------------------------------

Patient Contact Information

----------------------------------------------

Contact Name:FELIPE                                 Relationship:Daughter

Address:                                             Home [phone]

                                                     Work [phone]

City:[place]                                         Alternate Phone:

State/Zip Code:[place] [address]                              Email:

----------------------------------------------

Financial Information

----------------------------------------------

Financial Class:Medicare

Primary Plan Desc:MEDICARE INPATIENT                 Primary Plan Number:[id number]

Secondary Plan Desc:A[employer]/MDR SUPPLEMENT              Secondary Plan Number:[id number]

 

 

----------------------------------------------

Assessment Information

----------------------------------------------

----------------------------------------------

LACE

----------------------------------------------

LACE

 

Comorbidities - select        Answers:  Coronary Artery Disease               

all that apply                                                                

                                        Dementia                              

                                        History of falls                      

                                        Mild liver or renal                   

                                        disease                               

                                        Opioid dependence                     

                                        / Chronic pain                        

# of Emergency department     Answers:  1-2                                   

visits in the last 6                                                          

months                                                                        

Score: 15

 

Date Signed:  09/28/2018 10:38 AM

Electronically Signed By:Liz Garcia RN

 

 

----------------------------------------------

[facility] CM Progress Note

----------------------------------------------

CM Note

 

CM Note                       

Notes:

Patient admitted from [facility] where she was found to be in A Fib with RVR. She will have a 

ARMAAN guided cardioversion this afternoon. 



Patient normally lives alone at [facility]. She has a supportive daughter 

named Zara. PT/OT have been ordered. Case Management will follow for d/c planning.

 

Date Signed:  09/28/2018 01:36 PM

Electronically Signed By:Graciela Mock RN

 

 

----------------------------------------------

[facility] CM Progress Note

----------------------------------------------

CM Note

 

CM Note                       

Notes:

Patient had cardioversions and pacemaker reprogramming, has systolic hrt failure and mod-severe 

MR, UTI. PT working with her and feels as though she can return to her Assisted Living at [facility] [phone]. CM to follow.

 

Date Signed:  10/01/2018 04:41 PM

Electronically Signed By:Liberty Killian LCSW

 

 

----------------------------------------------

[facility] CM Progress Note

----------------------------------------------

CM Note

 

CM Note                       

Notes:

10/2/2018 Case Management Note



Faxed updates to [facility].  Discussed with Mendez on the phone.



Case Management d/c poc: [facility] RN and PT



Case Management to follow.

 

Date Signed:  10/02/2018 12:46 PM

Electronically Signed By:Ragini Zelaya RN

 

 

----------------------------------------------

Case Management Discharge Plan Note

----------------------------------------------

Case Management Discharge

 

Discharge Order Complete?     Answers:  Yes                                   

Patient to Obtain             Answers:  Other                         Notes:  [facility]

Medications                                                                   

Faxed Final Orders            Answers:  Yes                           Notes:  to [facility]

Agency/Facility Transfer      Answers:  Yes                           Notes:  to [facility]

Report Printed & Faxed to                                                     

Receiving Agency                                                              

Discharge Comments            

Notes:

10/2/2018 Case Management Note



Pt returning to [facility] resuming RN and PT through [facility] 

Services.  [facility]  RN to call report.

 

Date Signed:  10/02/2018 04:01 PM

Electronically Signed By:Ragini Zelaya RN

 

 

----------------------------------------------

Intervention Information

----------------------------------------------

Intervention Type:*SAL-Signed                       Date of Service:09/28/2018 04:32 PM

Patient Type:Inpatient                               Staff Member:FRANCESCO Garcia, Liz

Hours:                                               Discipline:

Severity:                                            Comment:

Intervention Type:IM-Pt. Not Available               Date of Service:10/02/2018 03:59 PM

Patient Type:Inpatient                               Staff Member:Tamra Mendez

Hours:                                               Discipline:

Severity:                                            Comment: